# Patient Record
Sex: MALE | Race: WHITE | NOT HISPANIC OR LATINO | Employment: OTHER | URBAN - METROPOLITAN AREA
[De-identification: names, ages, dates, MRNs, and addresses within clinical notes are randomized per-mention and may not be internally consistent; named-entity substitution may affect disease eponyms.]

---

## 2017-01-12 ENCOUNTER — OFFICE VISIT (OUTPATIENT)
Dept: OPTOMETRY | Facility: CLINIC | Age: 77
End: 2017-01-12
Payer: MEDICARE

## 2017-01-12 DIAGNOSIS — H53.8 BLURRED VISION: Primary | ICD-10-CM

## 2017-01-12 PROCEDURE — 99499 UNLISTED E&M SERVICE: CPT | Mod: S$GLB,,, | Performed by: OPTOMETRIST

## 2017-01-12 NOTE — PROGRESS NOTES
HPI     Blurred Vision    Additional comments: pt states blurred VA OU at both near & distance w/   new specs made here last month // 2nd remake       Last edited by Yari Tucker on 1/12/2017  8:27 AM. (History)            Assessment /Plan     For exam results, see Encounter Report.    Blurred vision      Refraction stable  Issues with progressives and functional monocular vision  Discussed options  Readers/ distance only  FT bifocal  FT trifocal    Remake to FT trifocal---call if issues

## 2017-04-12 ENCOUNTER — HOSPITAL ENCOUNTER (OUTPATIENT)
Dept: RADIOLOGY | Facility: HOSPITAL | Age: 77
Discharge: HOME | End: 2017-04-12
Attending: PODIATRIST
Payer: MEDICARE

## 2017-04-12 ENCOUNTER — OFFICE VISIT (OUTPATIENT)
Dept: PODIATRY | Facility: CLINIC | Age: 77
End: 2017-04-12
Payer: MEDICARE

## 2017-04-12 VITALS — WEIGHT: 240.75 LBS | HEIGHT: 70 IN | BODY MASS INDEX: 34.47 KG/M2

## 2017-04-12 DIAGNOSIS — M79.661 PAIN OF RIGHT CALF: Primary | ICD-10-CM

## 2017-04-12 DIAGNOSIS — M79.661 PAIN OF RIGHT CALF: ICD-10-CM

## 2017-04-12 DIAGNOSIS — I87.2 VENOUS INSUFFICIENCY OF BOTH LOWER EXTREMITIES: ICD-10-CM

## 2017-04-12 DIAGNOSIS — I83.93 VARICOSE VEINS OF BOTH LOWER EXTREMITIES: ICD-10-CM

## 2017-04-12 PROCEDURE — 93971 EXTREMITY STUDY: CPT | Mod: 26,RT,, | Performed by: RADIOLOGY

## 2017-04-12 PROCEDURE — 1160F RVW MEDS BY RX/DR IN RCRD: CPT | Mod: S$GLB,,, | Performed by: PODIATRIST

## 2017-04-12 PROCEDURE — 93971 EXTREMITY STUDY: CPT | Mod: TC,PO,RT

## 2017-04-12 PROCEDURE — 99213 OFFICE O/P EST LOW 20 MIN: CPT | Mod: S$GLB,,, | Performed by: PODIATRIST

## 2017-04-12 PROCEDURE — 1159F MED LIST DOCD IN RCRD: CPT | Mod: S$GLB,,, | Performed by: PODIATRIST

## 2017-04-12 PROCEDURE — 99999 PR PBB SHADOW E&M-EST. PATIENT-LVL III: CPT | Mod: PBBFAC,,, | Performed by: PODIATRIST

## 2017-04-12 PROCEDURE — 1157F ADVNC CARE PLAN IN RCRD: CPT | Mod: S$GLB,,, | Performed by: PODIATRIST

## 2017-04-12 PROCEDURE — 1125F AMNT PAIN NOTED PAIN PRSNT: CPT | Mod: S$GLB,,, | Performed by: PODIATRIST

## 2017-04-12 NOTE — PROGRESS NOTES
"Subjective:      Patient ID: Samson Campos Jr. is a 76 y.o. male.    Chief Complaint: Heel Pain (rt foot) and Leg Pain (rt leg x3 weeks)  Patient presents to clinic with the complaint of Rt. Medial leg pain for the past 3 weeks.  Denies trauma to the affected limb.  States that a portion of the leg began to form a "knot" that has been exquisitely tender to touch for over three weeks ago.  States that symptoms have recently lessened over the past several days.  Reports being seen by his PCP who placed him on both tramadol and a medrol dose pack which has helped to improve symptoms.  Relates that he occasionally has a flare up of previous Rt. Posterior heel pain, but is asymptomatic with today's exam.  Denies any additional pedal complaints.    Past Medical History:   Diagnosis Date    Atrial fibrillation 8/21/2015    Cataract     os    Edema 8/21/2015    Fractures     Hypertension     Nuclear sclerosis 3/11/2015    Obstructive sleep apnea 1/14/2016    Unspecified essential hypertension 8/20/2015    Venous insufficiency 1/14/2016       Past Surgical History:   Procedure Laterality Date    CATARACT EXTRACTION W/  INTRAOCULAR LENS IMPLANT Right 3/11/15    By Tan    COLONOSCOPY      GALLBLADDER SURGERY      JOINT REPLACEMENT Right     Knee    ROTATOR CUFF REPAIR         Family History   Problem Relation Age of Onset    Cataracts Mother     Cancer Father     Amblyopia Neg Hx     Blindness Neg Hx     Diabetes Neg Hx     Glaucoma Neg Hx     Hypertension Neg Hx     Macular degeneration Neg Hx     Retinal detachment Neg Hx     Strabismus Neg Hx     Stroke Neg Hx     Thyroid disease Neg Hx        Social History     Social History    Marital status:      Spouse name: N/A    Number of children: N/A    Years of education: N/A     Social History Main Topics    Smoking status: Former Smoker    Smokeless tobacco: Never Used    Alcohol use 8.4 oz/week     14 Cans of beer per week    Drug " use: No    Sexual activity: Not Asked     Other Topics Concern    None     Social History Narrative       Current Outpatient Prescriptions   Medication Sig Dispense Refill    apixaban (ELIQUIS) 5 mg Tab Take 1 tablet (5 mg total) by mouth 2 (two) times daily. 60 tablet 0    cyanocobalamin 1,000 mcg/mL injection INJECT 1 ML (1,000 MCG TOTAL) INTO THE MUSCLE EVERY  30 DAYS. 10 mL 5    hydrocodone-acetaminophen 10-325mg (NORCO)  mg Tab Take 1 tablet by mouth as needed.       metoprolol succinate (TOPROL-XL) 25 MG 24 hr tablet Take 1 tablet (25 mg total) by mouth 2 (two) times daily. 60 tablet 2    predniSONE (DELTASONE) 20 MG tablet 2 tablets once daily for 5 days. 10 tablet 0    tramadol (ULTRAM) 50 mg tablet Take 1 tablet (50 mg total) by mouth every 6 (six) hours as needed for Pain. 40 tablet 1     No current facility-administered medications for this visit.        Review of patient's allergies indicates:  No Known Allergies    Review of Systems   Constitution: Negative for chills and fever.   Cardiovascular: Positive for leg swelling. Negative for claudication.   Skin: Positive for color change and nail changes.   Musculoskeletal: Positive for myalgias. Negative for arthritis, joint pain and joint swelling.   Neurological: Negative for numbness and paresthesias.   Psychiatric/Behavioral: Negative for altered mental status.           Objective:      Physical Exam   Constitutional: He is oriented to person, place, and time. He appears well-developed and well-nourished. No distress.   Cardiovascular:   Pulses:       Dorsalis pedis pulses are 2+ on the right side, and 2+ on the left side.        Posterior tibial pulses are 1+ on the right side, and 1+ on the left side.   CFT <3 seconds bilateral.  Pedal hair growth present bilateral.  Varicosities noted bilateral.  Moderate nonpitting edema noted bilateral.    Musculoskeletal: He exhibits edema and tenderness.   Muscle strength 5/5 in all muscle groups  bilateral.  No pain with active or passive ROM about bilateral foot and ankle.   (+) Christensen sign on the Rt.  (-) Susi sign on the Rt.  Swelling and induration noted to the Rt. Medial lower leg along distal aspect of the medial gastrocnemius that is tender to palpation.  No pain with palpation to the posterior superior aspect of the Rt. Calcaneus at the present site of an exostosis.  No pain at insertion of the Rt. Achilles nor palpable dell or defect noted to the tendon. Bilateral pes cavus foot type.  Bilateral hallux abducto valgus.  Bilateral semi-reducible contracture of toes 2-5.  Bilateral equinus deformity.   Neurological: He is alert and oriented to person, place, and time. He has normal strength. No sensory deficit.   Light touch intact bilateral.     Skin: Skin is warm, dry and intact. No abrasion, no bruising, no burn, no ecchymosis, no laceration, no lesion, no petechiae and no rash noted. He is not diaphoretic. No cyanosis or erythema. No pallor. Nails show no clubbing.   Pedal skin has normal turgor, temperature, and texture bilateral.  Toenails x 10 appear mycotic. Examination of the skin reveals no evidence of significant maceration, rashes, open lesions, suspicious appearing nevi or other concerning lesions.              Assessment:       Encounter Diagnoses   Name Primary?    Pain of right calf Yes    Venous insufficiency of both lower extremities     Varicose veins of both lower extremities          Plan:       Samson was seen today for heel pain and leg pain.    Diagnoses and all orders for this visit:    Pain of right calf  -     US Lower Extremity Veins Right; Future    Venous insufficiency of both lower extremities    Varicose veins of both lower extremities      I counseled the patient on his conditions, their implications and medical management.    Orders written for a venous ultrasound of the RLE.  No evidence of SVT or DVT appreciated.  Patient's current symptoms are likely due to strain  of the distal medial portion of the gastrocnemius.      Advised to continue with tramadol to address pain.    Advised to apply aspercream to the affected area.    Recommend icing the gastrocnemius at least 20 minutes twice daily.    May ambulate and perform usual activities to tolerance, as he is asymptomatic with normal ambulation.    RTC prn or sooner if symptoms fail to resolve in the next two weeks.    Vinnie Cowan DPM

## 2017-04-12 NOTE — MR AVS SNAPSHOT
[facility]  [address]  [facility] [address]  Phone: [phone]                  Samson Campos JrCassidy   2017 9:20 AM   Office Visit    Description:  Male : 1940   Provider:  Vinnie Cowan DPM   Department:  [place] [facility]           Reason for Visit     Heel Pain     Leg Pain           Diagnoses this Visit        Comments    Pain of right calf    -  Primary     Venous insufficiency of both lower extremities         Varicose veins of both lower extremities                To Do List           Future Appointments        Provider Department Dept Phone    2017 10:00 AM [facility] US[facility] [phone]      Goals (5 Years of Data)     None      Follow-Up and Disposition     Return if symptoms worsen or fail to improve.      [facility]     [facility] Nurse Care Line -  Assistance  Unless otherwise directed by your provider, please contact [facility], our nurse care line that is available for  assistance.     Registered nurses in the [facility] provide: appointment scheduling, clinical advisement, health education, and other advisory services.  Call: [phone] (toll free)               Medications           Message regarding Medications     Verify the changes and/or additions to your medication regime listed below are the same as discussed with your clinician today.  If any of these changes or additions are incorrect, please notify your healthcare provider.             Verify that the below list of medications is an accurate representation of the medications you are currently taking.  If none reported, the list may be blank. If incorrect, please contact your healthcare provider. Carry this list with you in case of emergency.           Current Medications     apixaban (ELIQUIS) 5 mg Tab Take 1 tablet (5 mg total) by mouth 2 (two) times daily.    cyanocobalamin 1,000 mcg/mL injection INJECT 1 ML (1,000 MCG TOTAL) INTO  "THE MUSCLE EVERY  30 DAYS.    hydrocodone-acetaminophen 10-325mg (NORCO)  mg Tab Take 1 tablet by mouth as needed.     metoprolol succinate (TOPROL-XL) 25 MG 24 hr tablet Take 1 tablet (25 mg total) by mouth 2 (two) times daily.    predniSONE (DELTASONE) 20 MG tablet 2 tablets once daily for 5 days.    tramadol (ULTRAM) 50 mg tablet Take 1 tablet (50 mg total) by mouth every 6 (six) hours as needed for Pain.           Clinical Reference Information           Your Vitals Were     Height Weight BMI          5' 10" (1.778 m) 109.2 kg (240 lb 11.9 oz) 34.54 kg/m2        Allergies as of 4/12/2017     No Known Allergies      Immunizations Administered on Date of Encounter - 4/12/2017     None      Orders Placed During Today's Visit     Future Labs/Procedures Expected by Expires    US Lower Extremity Veins Right  4/12/2017 4/12/2018      Language Assistance Services     ATTENTION: Language assistance services are available, free of charge. Please call [phone].      ATENCIÓN: Si apple rodriguez, tiene a rodriguez disposición servicios gratuitos de asistencia lingüística. Llame al [phone].     LORENZA Ý: N?u b?n nói Ti?ng Vi?t, có các d?ch v? h? tr? ngôn ng? mi?n phí whith cho b?n. G?i s? [phone].         [facility] complies with applicable Federal civil rights laws and does not discriminate on the basis of race, color, national origin, age, disability, or sex.        "

## 2017-04-27 ENCOUNTER — OFFICE VISIT (OUTPATIENT)
Dept: PODIATRY | Facility: CLINIC | Age: 77
End: 2017-04-27
Payer: MEDICARE

## 2017-04-27 VITALS — HEIGHT: 70 IN | WEIGHT: 241.38 LBS | BODY MASS INDEX: 34.56 KG/M2

## 2017-04-27 DIAGNOSIS — I87.2 VENOUS INSUFFICIENCY OF BOTH LOWER EXTREMITIES: ICD-10-CM

## 2017-04-27 DIAGNOSIS — I83.93 VARICOSE VEINS OF BOTH LOWER EXTREMITIES: ICD-10-CM

## 2017-04-27 DIAGNOSIS — G60.9 IDIOPATHIC PERIPHERAL NEUROPATHY: ICD-10-CM

## 2017-04-27 DIAGNOSIS — M76.60 ACHILLES TENDINITIS, UNSPECIFIED LATERALITY: Primary | ICD-10-CM

## 2017-04-27 PROCEDURE — 1159F MED LIST DOCD IN RCRD: CPT | Mod: S$GLB,,, | Performed by: PODIATRIST

## 2017-04-27 PROCEDURE — 99213 OFFICE O/P EST LOW 20 MIN: CPT | Mod: S$GLB,,, | Performed by: PODIATRIST

## 2017-04-27 PROCEDURE — 99999 PR PBB SHADOW E&M-EST. PATIENT-LVL III: CPT | Mod: PBBFAC,,, | Performed by: PODIATRIST

## 2017-04-27 PROCEDURE — 1125F AMNT PAIN NOTED PAIN PRSNT: CPT | Mod: S$GLB,,, | Performed by: PODIATRIST

## 2017-04-27 PROCEDURE — 1160F RVW MEDS BY RX/DR IN RCRD: CPT | Mod: S$GLB,,, | Performed by: PODIATRIST

## 2017-04-27 NOTE — MR AVS SNAPSHOT
[facility]  [address]  [facility] [address]  Phone: [phone]                  Samson Campos JrCassidy   2017 10:20 AM   Office Visit    Description:  Male : 1940   Provider:  Vinnie Cowan DPM   Department:  [facility]           Reason for Visit     Foot Pain           Diagnoses this Visit        Comments    Achilles tendinitis, unspecified laterality    -  Primary     Venous insufficiency of both lower extremities         Varicose veins of both lower extremities         Idiopathic peripheral neuropathy                To Do List           Future Appointments        Provider Department Dept Phone    2017 10:20 AM [facility] [phone]      Goals (5 Years of Data)     None      Follow-Up and Disposition     Return in about 4 weeks (around 2017).      [facility]     [facility] Care [facility] -  Assistance  Unless otherwise directed by your provider, please contact [facility], our nurse care line that is available for  assistance.     Registered nurses in the [facility] provide: appointment scheduling, clinical advisement, health education, and other advisory services.  Call: [phone] (toll free)               Medications           Message regarding Medications     Verify the changes and/or additions to your medication regime listed below are the same as discussed with your clinician today.  If any of these changes or additions are incorrect, please notify your healthcare provider.             Verify that the below list of medications is an accurate representation of the medications you are currently taking.  If none reported, the list may be blank. If incorrect, please contact your healthcare provider. Carry this list with you in case of emergency.           Current Medications     apixaban (ELIQUIS) 5 mg Tab Take 1 tablet (5 mg total) by mouth 2 (two) times daily.    cyanocobalamin 1,000  "mcg/mL injection INJECT 1 ML (1,000 MCG TOTAL) INTO THE MUSCLE EVERY  30 DAYS.    hydrocodone-acetaminophen 10-325mg (NORCO)  mg Tab Take 1 tablet by mouth as needed.     metoprolol succinate (TOPROL-XL) 25 MG 24 hr tablet Take 1 tablet (25 mg total) by mouth 2 (two) times daily.    predniSONE (DELTASONE) 20 MG tablet 2 tablets once daily for 5 days.           Clinical Reference Information           Your Vitals Were     Height Weight BMI          5' 10" (1.778 m) 109.5 kg (241 lb 6.5 oz) 34.64 kg/m2        Allergies as of 4/27/2017     No Known Allergies      Immunizations Administered on Date of Encounter - 4/27/2017     None      Instructions    Recommend taking a B-complex vitamin and 600mg of alpha lipoic acid daily.       Language Assistance Services     ATTENTION: Language assistance services are available, free of charge. Please call [phone].      ATENCIÓN: Si habla jennifer, tiene a rodriguez disposición servicios gratuitos de asistencia lingüística. Llame al [phone].     CHÚ Ý: N?u b?n nói Ti?ng Vi?t, có các d?ch v? h? tr? ngôn ng? mi?n phí lucy cho b?n. G?i s? [phone].         [facility] complies with applicable Federal civil rights laws and does not discriminate on the basis of race, color, national origin, age, disability, or sex.        "

## 2017-04-27 NOTE — PROGRESS NOTES
"Subjective:      Patient ID: Samson Campos Jr. is a 76 y.o. male.    Chief Complaint: Foot Pain (Rt foot pain and swelling , Rt ankle and side of foot)  Patient presents to clinic with contniued pain along the medial border of the Rt. Achilles tendon.  States that pain symptoms continue to be exacerbated with weight bearing activity, however, are also present while at rest.  States that the Rt. Leg feels "dead" and occasionally emits sharp pain that radiates down the foot.  States this is most prominent at night.  Has been applying ice to the Rt. Leg with noticeable improvement in symptoms.  Denies any additional pedal complaints.    Review of Systems   Constitution: Negative for chills and fever.   Cardiovascular: Positive for leg swelling. Negative for claudication.   Skin: Positive for color change and nail changes.   Musculoskeletal: Positive for myalgias. Negative for arthritis, joint pain and joint swelling.   Neurological: Positive for paresthesias. Negative for numbness.   Psychiatric/Behavioral: Negative for altered mental status.           Objective:      Physical Exam   Constitutional: He is oriented to person, place, and time. He appears well-developed and well-nourished. No distress.   Cardiovascular:   Pulses:       Dorsalis pedis pulses are 2+ on the right side, and 2+ on the left side.        Posterior tibial pulses are 1+ on the right side, and 1+ on the left side.   CFT <3 seconds bilateral.  Pedal hair growth present bilateral.  Varicosities noted bilateral.  Moderate nonpitting edema noted bilateral.    Musculoskeletal: He exhibits edema and tenderness.   Muscle strength 5/5 in all muscle groups bilateral.  No pain with active or passive ROM about bilateral foot and ankle.   (-) Christensen sign on the Rt.  (-) Susi sign on the Rt.  Swelling noted to the Rt. Medial lower leg along distal aspect of the medial gastrocnemius that is tender to palpation.  No pain with palpation to the posterior superior " aspect of the Rt. Calcaneus at the present site of an exostosis.  Pain with palpation along the proximal most portion of the Rt. Achilles tendon.  Mild tenderness with both active and passive dorsiflexion of the Rt. Ankle.   Bilateral pes cavus foot type.  Bilateral hallux abducto valgus.  Bilateral semi-reducible contracture of toes 2-5.  Bilateral equinus deformity.   Neurological: He is alert and oriented to person, place, and time. He has normal strength. A sensory deficit is present.   Protective sensation per Warrior-Niko monofilament decreased bilateral.    Vibratory sensation decreased bilateral.    Light touch intact bilateral.   Skin: Skin is warm, dry and intact. No abrasion, no bruising, no burn, no ecchymosis, no laceration, no lesion, no petechiae and no rash noted. He is not diaphoretic. No cyanosis or erythema. No pallor. Nails show no clubbing.   Pedal skin has normal turgor, temperature, and texture bilateral.  Toenails x 10 appear mycotic. Examination of the skin reveals no evidence of significant maceration, rashes, open lesions, suspicious appearing nevi or other concerning lesions.              Assessment:       Encounter Diagnoses   Name Primary?    Venous insufficiency of both lower extremities     Varicose veins of both lower extremities     Achilles tendinitis, unspecified laterality Yes    Idiopathic peripheral neuropathy          Plan:       Samson was seen today for foot pain.    Diagnoses and all orders for this visit:    Achilles tendinitis, unspecified laterality    Venous insufficiency of both lower extremities    Varicose veins of both lower extremities    Idiopathic peripheral neuropathy      I counseled the patient on his conditions, their implications and medical management.    Advised toe RICE the RLE.    Fitted and dispensed a postoperative boot to offload the Rt. Achilles tendon.    Recommend minimal weight bearing activity.    Advised to begin taking a B-complex  vitamin and 600mg of alpha lipoic acid daily.    RTC in 1 month for follow up.    Vinnie Cowan DPM

## 2017-05-19 ENCOUNTER — OFFICE VISIT (OUTPATIENT)
Dept: PODIATRY | Facility: CLINIC | Age: 77
End: 2017-05-19
Payer: MEDICARE

## 2017-05-19 VITALS — WEIGHT: 239.19 LBS | HEIGHT: 70 IN | BODY MASS INDEX: 34.24 KG/M2

## 2017-05-19 DIAGNOSIS — M76.60 ACHILLES TENDINITIS, UNSPECIFIED LATERALITY: ICD-10-CM

## 2017-05-19 DIAGNOSIS — I87.2 VENOUS INSUFFICIENCY OF BOTH LOWER EXTREMITIES: ICD-10-CM

## 2017-05-19 DIAGNOSIS — G60.9 IDIOPATHIC PERIPHERAL NEUROPATHY: ICD-10-CM

## 2017-05-19 DIAGNOSIS — I83.93 VARICOSE VEINS OF BOTH LOWER EXTREMITIES: ICD-10-CM

## 2017-05-19 DIAGNOSIS — M21.6X9 EQUINUS DEFORMITY OF FOOT: ICD-10-CM

## 2017-05-19 DIAGNOSIS — M79.661 PAIN OF RIGHT CALF: Primary | ICD-10-CM

## 2017-05-19 PROCEDURE — 1125F AMNT PAIN NOTED PAIN PRSNT: CPT | Mod: S$GLB,,, | Performed by: PODIATRIST

## 2017-05-19 PROCEDURE — 99999 PR PBB SHADOW E&M-EST. PATIENT-LVL III: CPT | Mod: PBBFAC,,, | Performed by: PODIATRIST

## 2017-05-19 PROCEDURE — 1160F RVW MEDS BY RX/DR IN RCRD: CPT | Mod: S$GLB,,, | Performed by: PODIATRIST

## 2017-05-19 PROCEDURE — 1159F MED LIST DOCD IN RCRD: CPT | Mod: S$GLB,,, | Performed by: PODIATRIST

## 2017-05-19 PROCEDURE — 99213 OFFICE O/P EST LOW 20 MIN: CPT | Mod: S$GLB,,, | Performed by: PODIATRIST

## 2017-05-19 NOTE — MR AVS SNAPSHOT
[facility]  [address]  [facility] [address]  Phone: [phone]                  Samson Campos JrCassidy   2017 10:20 AM   Office Visit    Description:  Male : 1940   Provider:  Vinnie Cowan DPM   Department:  [facility]           Reason for Visit     Leg Problem           Diagnoses this Visit        Comments    Pain of right calf    -  Primary     Achilles tendinitis, unspecified laterality         Idiopathic peripheral neuropathy                To Do List           Future Appointments        Provider Department Dept Phone    2017 8:00 AM [facility] MRI1 [address] [facility] [phone]      Goals (5 Years of Data)     None      [facility]     [facility] Nurse Care [facility] -  Assistance  Unless otherwise directed by your provider, please contact [facility], our nurse care line that is available for  assistance.     Registered nurses in the [facility] provide: appointment scheduling, clinical advisement, health education, and other advisory services.  Call: [phone] (toll free)               Medications           Message regarding Medications     Verify the changes and/or additions to your medication regime listed below are the same as discussed with your clinician today.  If any of these changes or additions are incorrect, please notify your healthcare provider.             Verify that the below list of medications is an accurate representation of the medications you are currently taking.  If none reported, the list may be blank. If incorrect, please contact your healthcare provider. Carry this list with you in case of emergency.           Current Medications     apixaban (ELIQUIS) 5 mg Tab Take 1 tablet (5 mg total) by mouth 2 (two) times daily.    cyanocobalamin 1,000 mcg/mL injection INJECT 1 ML (1,000 MCG TOTAL) INTO THE MUSCLE EVERY  30 DAYS.    hydrocodone-acetaminophen 10-325mg (NORCO)  mg Tab  "Take 1 tablet by mouth as needed.     metoprolol succinate (TOPROL-XL) 25 MG 24 hr tablet Take 1 tablet (25 mg total) by mouth 2 (two) times daily.    predniSONE (DELTASONE) 20 MG tablet 2 tablets once daily for 5 days.    tramadol (ULTRAM) 50 mg tablet Take 1 tablet (50 mg total) by mouth every 6 (six) hours as needed for Pain.           Clinical Reference Information           Your Vitals Were     Height Weight BMI          5' 10" (1.778 m) 108.5 kg (239 lb 3.2 oz) 34.32 kg/m2        Allergies as of 5/19/2017     No Known Allergies      Immunizations Administered on Date of Encounter - 5/19/2017     None      Orders Placed During Today's Visit     Future Labs/Procedures Expected by Expires    Creatinine, serum  5/19/2017 7/18/2018    MRI Lower Extremity W WO Contrast Right  5/19/2017 5/19/2018      Language Assistance Services     ATTENTION: Language assistance services are available, free of charge. Please call [phone].      ATENCIÓN: Si habla jennifer, tiene a rodriguez disposición servicios gratuitos de asistencia lingüística. Llame al [phone].     LORENZA Ý: N?u b?n nói Ti?ng Vi?t, có các d?ch v? h? tr? ngôn ng? mi?n phí whith cho b?n. G?i s? [phone].         [facility] complies with applicable Federal civil rights laws and does not discriminate on the basis of race, color, national origin, age, disability, or sex.        "

## 2017-05-19 NOTE — PROGRESS NOTES
Subjective:      Patient ID: Samson Campos Jr. is a 76 y.o. male.    Chief Complaint: Leg Problem (Pain ,redness and swelling Rt Calf ,ankle and heel)  Patient presents to clinic with contniued pain along the medial border of the Rt. Achilles tendon and gastrocnemius.  States that pain symptoms continue to be exacerbated with weight bearing activity, and they are now exacerbated with pressure while at rest.  Has attempted to offload with the postoperative boot, however, states this only exacerbated symptoms.  States that he continues icing the leg which does provide relief.  He has also been both elevating and wearing compression stockings as well.  Currently rates pain symptoms as an 8/10.  Denies any additional pedal complaints.    Review of Systems   Constitution: Negative for chills and fever.   Cardiovascular: Positive for leg swelling. Negative for claudication.   Skin: Positive for color change and nail changes.   Musculoskeletal: Positive for myalgias. Negative for arthritis, joint pain and joint swelling.   Neurological: Positive for paresthesias. Negative for numbness.   Psychiatric/Behavioral: Negative for altered mental status.           Objective:      Physical Exam   Constitutional: He is oriented to person, place, and time. He appears well-developed and well-nourished. No distress.   Cardiovascular:   Pulses:       Dorsalis pedis pulses are 2+ on the right side, and 2+ on the left side.        Posterior tibial pulses are 1+ on the right side, and 1+ on the left side.   CFT <3 seconds bilateral.  Pedal hair growth present bilateral.  Varicosities noted bilateral.  1+ pitting edema noted to the RLE.    Musculoskeletal: He exhibits edema and tenderness.   Muscle strength 5/5 in all muscle groups bilateral.  No pain with active or passive ROM about bilateral foot and ankle.   (-) Christensen sign on the Rt.  (-) Susi sign on the Rt.  Swelling noted to the Rt. Medial lower leg along distal aspect of the medial  gastrocnemius that is tender to palpation.  No pain with palpation to the posterior superior aspect of the Rt. Calcaneus at the present site of an exostosis.  Pain with palpation along the proximal most portion of the Rt. Achilles tendon.  Moderate pain with both active and passive dorsiflexion of the Rt. Ankle.   Bilateral pes cavus foot type.  Bilateral hallux abducto valgus.  Bilateral semi-reducible contracture of toes 2-5.  Bilateral equinus deformity.   Neurological: He is alert and oriented to person, place, and time. He has normal strength. A sensory deficit is present.   Protective sensation per Brohman-Niko monofilament decreased bilateral.    Vibratory sensation decreased bilateral.    Light touch intact bilateral.   Skin: Skin is warm, dry and intact. No abrasion, no bruising, no burn, no ecchymosis, no laceration, no lesion, no petechiae and no rash noted. He is not diaphoretic. No cyanosis or erythema. No pallor. Nails show no clubbing.   Pedal skin has normal turgor, temperature, and texture bilateral.  Toenails x 10 appear mycotic. Examination of the skin reveals no evidence of significant maceration, rashes, open lesions, suspicious appearing nevi or other concerning lesions.              Assessment:       Encounter Diagnoses   Name Primary?    Pain of right calf Yes    Achilles tendinitis, unspecified laterality     Idiopathic peripheral neuropathy     Venous insufficiency of both lower extremities     Varicose veins of both lower extremities     Equinus deformity of foot          Plan:       Samson was seen today for leg problem.    Diagnoses and all orders for this visit:    Pain of right calf  -     MRI Lower Extremity W WO Contrast Right; Future  -     Creatinine, serum; Future    Achilles tendinitis, unspecified laterality  -     MRI Lower Extremity W WO Contrast Right; Future  -     Creatinine, serum; Future    Idiopathic peripheral neuropathy    Venous insufficiency of both lower  extremities    Varicose veins of both lower extremities    Equinus deformity of foot      I counseled the patient on his conditions, their implications and medical management.    Advised to continue RICE of the RLE.    Explained that wearing the postoperative boot is necessary to effectively offload the Achilles tendon.    Advised to take one tramadol daily to help with pain.    Recommend minimal weight bearing activity.    Advised to continue taking a B-complex vitamin and 600mg of alpha lipoic acid daily.    Will obtain an MRI of the RLE to rule out any additional pathology responsible for pain.    RTC pending MRI results.    Vinnie Cowan DPM

## 2017-05-20 ENCOUNTER — HOSPITAL ENCOUNTER (OUTPATIENT)
Dept: RADIOLOGY | Facility: HOSPITAL | Age: 77
Discharge: HOME | End: 2017-05-20
Attending: PODIATRIST
Payer: MEDICARE

## 2017-05-20 DIAGNOSIS — M79.661 PAIN OF RIGHT CALF: ICD-10-CM

## 2017-05-20 DIAGNOSIS — M76.60 ACHILLES TENDINITIS, UNSPECIFIED LATERALITY: ICD-10-CM

## 2017-05-20 PROCEDURE — 73718 MRI LOWER EXTREMITY W/O DYE: CPT | Mod: TC,RT

## 2017-05-20 PROCEDURE — 73718 MRI LOWER EXTREMITY W/O DYE: CPT | Mod: 26,RT,, | Performed by: RADIOLOGY

## 2017-06-01 PROBLEM — I49.3 PVCS (PREMATURE VENTRICULAR CONTRACTIONS): Status: ACTIVE | Noted: 2017-06-01

## 2017-07-03 PROBLEM — I48.92 ATRIAL FLUTTER WITH RAPID VENTRICULAR RESPONSE: Status: ACTIVE | Noted: 2017-07-03

## 2017-10-06 PROBLEM — I49.5 SSS (SICK SINUS SYNDROME): Status: ACTIVE | Noted: 2017-10-06

## 2018-02-23 PROBLEM — Z95.0 CARDIAC PACEMAKER: Status: ACTIVE | Noted: 2018-02-23

## 2018-06-25 PROBLEM — I48.0 PAROXYSMAL ATRIAL FIBRILLATION: Status: ACTIVE | Noted: 2018-06-25

## 2018-07-31 PROBLEM — Z98.890 S/P ABLATION OF ATRIAL FIBRILLATION: Status: ACTIVE | Noted: 2018-07-31

## 2018-07-31 PROBLEM — Z86.79 S/P ABLATION OF ATRIAL FIBRILLATION: Status: ACTIVE | Noted: 2018-07-31

## 2020-01-03 ENCOUNTER — CLINICAL SUPPORT (OUTPATIENT)
Dept: AUDIOLOGY | Facility: CLINIC | Age: 80
End: 2020-01-03
Payer: MEDICARE

## 2020-01-03 ENCOUNTER — OFFICE VISIT (OUTPATIENT)
Dept: OTOLARYNGOLOGY | Facility: CLINIC | Age: 80
End: 2020-01-03
Payer: MEDICARE

## 2020-01-03 VITALS — WEIGHT: 232.81 LBS | HEIGHT: 70 IN | BODY MASS INDEX: 33.33 KG/M2

## 2020-01-03 DIAGNOSIS — H61.23 BILATERAL IMPACTED CERUMEN: Primary | ICD-10-CM

## 2020-01-03 DIAGNOSIS — J31.0 CHRONIC RHINITIS: ICD-10-CM

## 2020-01-03 DIAGNOSIS — R09.81 CHRONIC NASAL CONGESTION: ICD-10-CM

## 2020-01-03 DIAGNOSIS — H93.13 TINNITUS, BILATERAL: ICD-10-CM

## 2020-01-03 DIAGNOSIS — H93.8X9 PRESSURE SENSATION IN EAR, UNSPECIFIED LATERALITY: Primary | ICD-10-CM

## 2020-01-03 PROCEDURE — 1101F PT FALLS ASSESS-DOCD LE1/YR: CPT | Mod: CPTII,S$GLB,, | Performed by: NURSE PRACTITIONER

## 2020-01-03 PROCEDURE — 99203 PR OFFICE/OUTPT VISIT, NEW, LEVL III, 30-44 MIN: ICD-10-PCS | Mod: 25,S$GLB,, | Performed by: NURSE PRACTITIONER

## 2020-01-03 PROCEDURE — 69210 REMOVE IMPACTED EAR WAX UNI: CPT | Mod: S$GLB,,, | Performed by: NURSE PRACTITIONER

## 2020-01-03 PROCEDURE — 1126F PR PAIN SEVERITY QUANTIFIED, NO PAIN PRESENT: ICD-10-PCS | Mod: S$GLB,,, | Performed by: NURSE PRACTITIONER

## 2020-01-03 PROCEDURE — 1159F PR MEDICATION LIST DOCUMENTED IN MEDICAL RECORD: ICD-10-PCS | Mod: S$GLB,,, | Performed by: NURSE PRACTITIONER

## 2020-01-03 PROCEDURE — 1101F PR PT FALLS ASSESS DOC 0-1 FALLS W/OUT INJ PAST YR: ICD-10-PCS | Mod: CPTII,S$GLB,, | Performed by: NURSE PRACTITIONER

## 2020-01-03 PROCEDURE — 1126F AMNT PAIN NOTED NONE PRSNT: CPT | Mod: S$GLB,,, | Performed by: NURSE PRACTITIONER

## 2020-01-03 PROCEDURE — 1159F MED LIST DOCD IN RCRD: CPT | Mod: S$GLB,,, | Performed by: NURSE PRACTITIONER

## 2020-01-03 PROCEDURE — 69210 PR REMOVAL IMPACTED CERUMEN REQUIRING INSTRUMENTATION, UNILATERAL: ICD-10-PCS | Mod: S$GLB,,, | Performed by: NURSE PRACTITIONER

## 2020-01-03 PROCEDURE — 99999 PR PBB SHADOW E&M-EST. PATIENT-LVL III: ICD-10-PCS | Mod: PBBFAC,,, | Performed by: NURSE PRACTITIONER

## 2020-01-03 PROCEDURE — 99999 PR PBB SHADOW E&M-EST. PATIENT-LVL III: CPT | Mod: PBBFAC,,, | Performed by: NURSE PRACTITIONER

## 2020-01-03 PROCEDURE — 99203 OFFICE O/P NEW LOW 30 MIN: CPT | Mod: 25,S$GLB,, | Performed by: NURSE PRACTITIONER

## 2020-01-03 NOTE — PROGRESS NOTES
Samson Campos Jr. was seen 01/03/2020 for tympanometry per order from Kenna Shukla, ENT NP,  due to complaint of ear pressure. Results indicate Type A for the right ear indicating normal middle ear function and Type A for the left ear indicating normal middle ear function.     Results will be reviewed by ENT following this encounter.

## 2020-01-03 NOTE — PROGRESS NOTES
Subjective:       Patient ID: Samson Campos Jr. is a 79 y.o. male.    Chief Complaint: Sinus Problem (Congestion, mouth breather) and Other (second opinion on ears)    HPI   Patient states his ears stop up periodically. He was evaluated at [facility] and was told he needs hearing aids. He was unable to afford them. He states he was given antibiotics a couple of times for his ears. He states he was told to use Nasacort every morning and saline rinsing every night. He states he was encouraged to consider PE tube placement AU. He is here today primarily for a second opinion on PE placement.   CT temporal bones 8/5/19:    LEFT: No opacification of the middle ear. Partial opacification of the air cells in the tip of the mastoid without any significant erosion of the bony walls of the mastoid air cells.  Findings are suggestive of mastoid tip effusion.  Rest of the mastoid air cells appear to be within normal limits.  RIGHT: No opacification of the middle ear.  In the tip of the right mastoid there are couple of air cells there appear to be opacified with fluid, likely from mastoid effusion.  No erosion of the bony septa of the mastoid air cells to suggest mastoiditis.   Audiogram was done at [facility] (date unknown if > 12 months ago, and not sure if air-bone gap was noted).     Review of Systems   Constitutional: Negative.    HENT: Positive for congestion, hearing loss and tinnitus.    Eyes: Negative.    Respiratory: Negative.    Cardiovascular: Negative.    Gastrointestinal: Negative.    Musculoskeletal: Negative.    Skin: Negative.    Neurological: Negative.    Hematological: Negative.    Psychiatric/Behavioral: Negative.        Objective:      Physical Exam   Constitutional: He is oriented to person, place, and time. Vital signs are normal. He appears well-developed and well-nourished. He is cooperative. He does not appear ill. No distress.   HENT:   Head: Normocephalic and atraumatic.   Right Ear: Hearing, tympanic  "membrane, external ear and ear canal normal. Tympanic membrane is not erythematous. Tympanic membrane mobility is normal. No middle ear effusion.   Left Ear: Hearing, tympanic membrane, external ear and ear canal normal. Tympanic membrane is not erythematous. Tympanic membrane mobility is normal.  No middle ear effusion.   Nose: Nose normal. No mucosal edema or rhinorrhea.   Mouth/Throat: Uvula is midline, oropharynx is clear and moist and mucous membranes are normal. He has dentures.     SEPARATE PROCEDURE IN OFFICE:   Procedure: Removal of impacted cerumen, bilateral   Pre Procedure Diagnosis: Cerumen Impaction   Post Procedure Diagnosis: Cerumen Impaction   Verbal informed consent in regards to risk of trauma to ear canal, ear drum or hearing, discomfort during procedure and/or inability to remove cerumen impaction in one session or unforeseen events or complications.   No anesthesia.     Procedure in detail:   Ear canal visualized bilateral with appropriate size ear speculum utilizing Operating Head Binocular Otomicroscope   Utilizing the following: Suction cannula was used AU. The impacted cerumen of the ear canals was removed atraumatically. The TM and EAC were then inspected and found to be clear of wax. See description of TMs/EACs in PE above.   Complications: No   Condition: Improved/Good    Type "A" tympanograms consistent with well-pneumatized mesotympanums and absence of middle ear effusions AU.     Eyes: EOM and lids are normal. Right eye exhibits no discharge. Left eye exhibits no discharge. No scleral icterus.   Neck: Trachea normal and normal range of motion. Neck supple. No tracheal deviation present.   Cardiovascular: Normal rate.   Pulmonary/Chest: Effort normal. No stridor. No respiratory distress. He has no wheezes.   Musculoskeletal: Normal range of motion.   Neurological: He is alert and oriented to person, place, and time. He has normal strength. Coordination and gait normal.   Skin: Skin is " "warm, dry and intact. He is not diaphoretic. No cyanosis. No pallor.   Psychiatric: He has a normal mood and affect. His speech is normal and behavior is normal. Judgment and thought content normal. Cognition and memory are normal.   Nursing note and vitals reviewed.      Assessment:     Bilateral cerumen impactions removed    Type "A" tympanograms consistent with well-pneumatized mesotympanums and absence of middle ear effusions  Need a copy of pt's most recent audiogram (if done > 12 months ago, we can repeat it here at any time)   Plan:     Recommend audiogram 12 months from date of most recent audiogram. If significant air-bone gap is noted, possible consideration of PETs (though type "A" tympanograms today, which is consistent with well-pneumatized mesotympanums and absence of middle ear effusions; however it may be that his recent CT findings are a chronic finding and unlikely to change. If no air-bone gap is noted, recommendation would consist of binaural amplification.     I agree that his regimen of Nasacort in AM and nasal saline rinsing in PM is a good one. My only suggestion is that he consider increasing both to twice daily during particularly severe nasal exacerbations in the hopes of staving off bacterial sinusitis.   "

## 2022-03-28 ENCOUNTER — TELEPHONE (OUTPATIENT)
Dept: OTOLARYNGOLOGY | Facility: CLINIC | Age: 82
End: 2022-03-28
Payer: MEDICARE

## 2022-03-28 NOTE — TELEPHONE ENCOUNTER
----- Message from Berenice Gtz sent at 3/28/2022  1:28 PM CDT -----  Type:  Sooner Apoointment Request    Caller is requesting a sooner appointment.  Caller declined first available appointment listed below.  Caller will not accept being placed on the waitlist and is requesting a message be sent to doctor.    Name of Caller:  Patient  When is the first available appointment?  5/2  Symptoms:  ear pain  Best Call Back Number:  [phone]  Additional Information:  Calling to schedule sooner appointment if possible for

## 2022-04-01 ENCOUNTER — OFFICE VISIT (OUTPATIENT)
Dept: OTOLARYNGOLOGY | Facility: CLINIC | Age: 82
End: 2022-04-01
Payer: MEDICARE

## 2022-04-01 VITALS — BODY MASS INDEX: 32.51 KG/M2 | HEIGHT: 70 IN | TEMPERATURE: 99 F | WEIGHT: 227.06 LBS

## 2022-04-01 DIAGNOSIS — H92.03 REFERRED OTALGIA, BILATERAL: ICD-10-CM

## 2022-04-01 DIAGNOSIS — R42 VERTIGO: ICD-10-CM

## 2022-04-01 DIAGNOSIS — H61.23 BILATERAL IMPACTED CERUMEN: Primary | ICD-10-CM

## 2022-04-01 PROCEDURE — 99999 PR PBB SHADOW E&M-EST. PATIENT-LVL III: CPT | Mod: PBBFAC,,, | Performed by: NURSE PRACTITIONER

## 2022-04-01 PROCEDURE — 1160F PR REVIEW ALL MEDS BY PRESCRIBER/CLIN PHARMACIST DOCUMENTED: ICD-10-PCS | Mod: CPTII,S$GLB,, | Performed by: NURSE PRACTITIONER

## 2022-04-01 PROCEDURE — 69210 REMOVE IMPACTED EAR WAX UNI: CPT | Mod: S$GLB,,, | Performed by: NURSE PRACTITIONER

## 2022-04-01 PROCEDURE — 1160F RVW MEDS BY RX/DR IN RCRD: CPT | Mod: CPTII,S$GLB,, | Performed by: NURSE PRACTITIONER

## 2022-04-01 PROCEDURE — 69210 PR REMOVAL IMPACTED CERUMEN REQUIRING INSTRUMENTATION, UNILATERAL: ICD-10-PCS | Mod: S$GLB,,, | Performed by: NURSE PRACTITIONER

## 2022-04-01 PROCEDURE — 1101F PR PT FALLS ASSESS DOC 0-1 FALLS W/OUT INJ PAST YR: ICD-10-PCS | Mod: CPTII,S$GLB,, | Performed by: NURSE PRACTITIONER

## 2022-04-01 PROCEDURE — 3288F PR FALLS RISK ASSESSMENT DOCUMENTED: ICD-10-PCS | Mod: CPTII,S$GLB,, | Performed by: NURSE PRACTITIONER

## 2022-04-01 PROCEDURE — 1159F PR MEDICATION LIST DOCUMENTED IN MEDICAL RECORD: ICD-10-PCS | Mod: CPTII,S$GLB,, | Performed by: NURSE PRACTITIONER

## 2022-04-01 PROCEDURE — 3288F FALL RISK ASSESSMENT DOCD: CPT | Mod: CPTII,S$GLB,, | Performed by: NURSE PRACTITIONER

## 2022-04-01 PROCEDURE — 1125F PR PAIN SEVERITY QUANTIFIED, PAIN PRESENT: ICD-10-PCS | Mod: CPTII,S$GLB,, | Performed by: NURSE PRACTITIONER

## 2022-04-01 PROCEDURE — 99213 OFFICE O/P EST LOW 20 MIN: CPT | Mod: 25,S$GLB,, | Performed by: NURSE PRACTITIONER

## 2022-04-01 PROCEDURE — 99213 PR OFFICE/OUTPT VISIT, EST, LEVL III, 20-29 MIN: ICD-10-PCS | Mod: 25,S$GLB,, | Performed by: NURSE PRACTITIONER

## 2022-04-01 PROCEDURE — 99999 PR PBB SHADOW E&M-EST. PATIENT-LVL III: ICD-10-PCS | Mod: PBBFAC,,, | Performed by: NURSE PRACTITIONER

## 2022-04-01 PROCEDURE — 1101F PT FALLS ASSESS-DOCD LE1/YR: CPT | Mod: CPTII,S$GLB,, | Performed by: NURSE PRACTITIONER

## 2022-04-01 PROCEDURE — 1125F AMNT PAIN NOTED PAIN PRSNT: CPT | Mod: CPTII,S$GLB,, | Performed by: NURSE PRACTITIONER

## 2022-04-01 PROCEDURE — 1159F MED LIST DOCD IN RCRD: CPT | Mod: CPTII,S$GLB,, | Performed by: NURSE PRACTITIONER

## 2022-04-01 NOTE — PROGRESS NOTES
Subjective:       Patient ID: Samson Campos Jr. is a 81 y.o. male.    Chief Complaint: Otalgia    Otalgia   Associated symptoms include hearing loss.      Patient last seen by me >2 years ago for cerumen, tinnitus, hearing loss. Patient returns today stating his ears are stopped up. Patient experienced vertigo upon laying down Sunday night, only felt it slightly upon laying down Monday night.     Review of Systems   Constitutional: Negative.    HENT: Positive for congestion, ear pain, hearing loss and tinnitus.    Eyes: Negative.    Respiratory: Negative.    Cardiovascular: Negative.    Gastrointestinal: Negative.    Musculoskeletal: Negative.    Skin: Negative.    Neurological: Negative.    Hematological: Negative.    Psychiatric/Behavioral: Negative.        Objective:      Physical Exam  Vitals and nursing note reviewed.   Constitutional:       General: He is not in acute distress.     Appearance: He is well-developed. He is not ill-appearing or diaphoretic.   HENT:      Head: Normocephalic and atraumatic.      Right Ear: Hearing, tympanic membrane, ear canal and external ear normal. No middle ear effusion. Tympanic membrane is not erythematous. Tympanic membrane has normal mobility.      Left Ear: Hearing, tympanic membrane, ear canal and external ear normal.  No middle ear effusion. Tympanic membrane is not erythematous. Tympanic membrane has normal mobility.      Nose: Nose normal. No mucosal edema or rhinorrhea.      Mouth/Throat:      Dentition: Has dentures.      Pharynx: Uvula midline.   Eyes:      General: Lids are normal. No scleral icterus.        Right eye: No discharge.         Left eye: No discharge.   Neck:      Trachea: Trachea normal. No tracheal deviation.   Cardiovascular:      Rate and Rhythm: Normal rate.   Pulmonary:      Effort: Pulmonary effort is normal. No respiratory distress.      Breath sounds: No stridor. No wheezing.   Musculoskeletal:         General: Normal range of motion.       Cervical back: Normal range of motion and neck supple.   Skin:     General: Skin is warm and dry.      Coloration: Skin is not pale.   Neurological:      Mental Status: He is alert and oriented to person, place, and time.      Coordination: Coordination normal.      Gait: Gait normal.   Psychiatric:         Speech: Speech normal.         Behavior: Behavior normal. Behavior is cooperative.         Thought Content: Thought content normal.         Judgment: Judgment normal.       SEPARATE PROCEDURE IN OFFICE:   Procedure: Removal of impacted cerumen, bilateral   Pre Procedure Diagnosis: Cerumen Impaction   Post Procedure Diagnosis: Cerumen Impaction   Verbal informed consent in regards to risk of trauma to ear canal, ear drum or hearing, discomfort during procedure and/or inability to remove cerumen impaction in one session or unforeseen events or complications.   No anesthesia.     Procedure in detail:   Ear canal visualized bilateral with appropriate size ear speculum utilizing Operating Head Binocular Otomicroscope   Utilizing the following:  Ring curet, delicate alligator forceps, and/or suction cannula was used. The impacted cerumen of the ear canals was removed atraumatically. The TM and EAC were then inspected and found to be clear of wax. See description of TMs/EACs in PE above.   Complications: No   Condition: Improved/Good      Negative Demian-Hallpike AU       Assessment:     Bilateral cerumen impactions removed    Vertigo  Plan:     Recommend audiogram at his convenience.     No evidence of BPPV at this time  Patient encouraged to return to clinic if symptoms worsen/persist and as needed for further ENT symptoms or concerns.      23.1

## 2022-12-16 ENCOUNTER — OFFICE VISIT (OUTPATIENT)
Dept: OTOLARYNGOLOGY | Facility: CLINIC | Age: 82
End: 2022-12-16
Payer: MEDICARE

## 2022-12-16 VITALS — HEIGHT: 70 IN | BODY MASS INDEX: 32.51 KG/M2 | TEMPERATURE: 99 F | WEIGHT: 227.06 LBS

## 2022-12-16 DIAGNOSIS — M26.623 BILATERAL TEMPOROMANDIBULAR JOINT PAIN: Primary | ICD-10-CM

## 2022-12-16 DIAGNOSIS — H92.03 REFERRED OTALGIA, BILATERAL: ICD-10-CM

## 2022-12-16 DIAGNOSIS — H61.23 BILATERAL IMPACTED CERUMEN: ICD-10-CM

## 2022-12-16 PROCEDURE — 99213 PR OFFICE/OUTPT VISIT, EST, LEVL III, 20-29 MIN: ICD-10-PCS | Mod: 25,S$GLB,, | Performed by: NURSE PRACTITIONER

## 2022-12-16 PROCEDURE — 69210 PR REMOVAL IMPACTED CERUMEN REQUIRING INSTRUMENTATION, UNILATERAL: ICD-10-PCS | Mod: S$GLB,,, | Performed by: NURSE PRACTITIONER

## 2022-12-16 PROCEDURE — 99999 PR PBB SHADOW E&M-EST. PATIENT-LVL II: ICD-10-PCS | Mod: PBBFAC,,, | Performed by: NURSE PRACTITIONER

## 2022-12-16 PROCEDURE — 69210 REMOVE IMPACTED EAR WAX UNI: CPT | Mod: S$GLB,,, | Performed by: NURSE PRACTITIONER

## 2022-12-16 PROCEDURE — 99999 PR PBB SHADOW E&M-EST. PATIENT-LVL II: CPT | Mod: PBBFAC,,, | Performed by: NURSE PRACTITIONER

## 2022-12-16 PROCEDURE — 1159F MED LIST DOCD IN RCRD: CPT | Mod: CPTII,S$GLB,, | Performed by: NURSE PRACTITIONER

## 2022-12-16 PROCEDURE — 3288F FALL RISK ASSESSMENT DOCD: CPT | Mod: CPTII,S$GLB,, | Performed by: NURSE PRACTITIONER

## 2022-12-16 PROCEDURE — 1101F PR PT FALLS ASSESS DOC 0-1 FALLS W/OUT INJ PAST YR: ICD-10-PCS | Mod: CPTII,S$GLB,, | Performed by: NURSE PRACTITIONER

## 2022-12-16 PROCEDURE — 1159F PR MEDICATION LIST DOCUMENTED IN MEDICAL RECORD: ICD-10-PCS | Mod: CPTII,S$GLB,, | Performed by: NURSE PRACTITIONER

## 2022-12-16 PROCEDURE — 1125F PR PAIN SEVERITY QUANTIFIED, PAIN PRESENT: ICD-10-PCS | Mod: CPTII,S$GLB,, | Performed by: NURSE PRACTITIONER

## 2022-12-16 PROCEDURE — 1125F AMNT PAIN NOTED PAIN PRSNT: CPT | Mod: CPTII,S$GLB,, | Performed by: NURSE PRACTITIONER

## 2022-12-16 PROCEDURE — 99213 OFFICE O/P EST LOW 20 MIN: CPT | Mod: 25,S$GLB,, | Performed by: NURSE PRACTITIONER

## 2022-12-16 PROCEDURE — 1101F PT FALLS ASSESS-DOCD LE1/YR: CPT | Mod: CPTII,S$GLB,, | Performed by: NURSE PRACTITIONER

## 2022-12-16 PROCEDURE — 3288F PR FALLS RISK ASSESSMENT DOCUMENTED: ICD-10-PCS | Mod: CPTII,S$GLB,, | Performed by: NURSE PRACTITIONER

## 2022-12-16 NOTE — PROGRESS NOTES
"Subjective:       Patient ID: Samson Campos Jr. is a 82 y.o. male.    Chief Complaint: No chief complaint on file.    HPI  Patient saw me 7 months ago for vertigo, otalgia, and cerumen impactions. No evidence of BPPV at that time. He was encouraged to schedule audiogram at his earliest convenience. Patient returns today for bilateral otalgia. Patient states his ear "bother" him daily, usually in the evening. They get to throbbing, radiates to back of jaw/neck behind ears.     Review of Systems   Constitutional: Negative.    HENT:  Positive for ear pain. Negative for ear discharge.    Eyes: Negative.    Respiratory: Negative.     Cardiovascular: Negative.    Gastrointestinal: Negative.    Musculoskeletal: Negative.    Integumentary:  Negative.   Neurological: Negative.    Hematological: Negative.    Psychiatric/Behavioral: Negative.         Objective:      Physical Exam  Vitals and nursing note reviewed.   Constitutional:       General: He is not in acute distress.     Appearance: He is well-developed. He is not ill-appearing.   HENT:      Head: Normocephalic and atraumatic.      Right Ear: Hearing, tympanic membrane, ear canal and external ear normal. No middle ear effusion. Tympanic membrane is not erythematous.      Left Ear: Hearing, tympanic membrane, ear canal and external ear normal.  No middle ear effusion. Tympanic membrane is not erythematous.      Nose: Nose normal.   Eyes:      General: Lids are normal. No scleral icterus.        Right eye: No discharge.         Left eye: No discharge.   Neck:      Trachea: Trachea normal. No tracheal deviation.   Cardiovascular:      Rate and Rhythm: Normal rate.   Pulmonary:      Effort: Pulmonary effort is normal. No respiratory distress.      Breath sounds: No stridor. No wheezing.   Musculoskeletal:         General: Normal range of motion.      Cervical back: Normal range of motion.   Skin:     General: Skin is warm and dry.   Neurological:      Mental Status: He is " alert and oriented to person, place, and time.      Coordination: Coordination is intact.      Gait: Gait normal.   Psychiatric:         Attention and Perception: Attention normal.         Mood and Affect: Mood normal.         Speech: Speech normal.         Behavior: Behavior normal. Behavior is cooperative.       SEPARATE PROCEDURE IN OFFICE:   Procedure: Removal of impacted cerumen, bilateral   Pre Procedure Diagnosis: Cerumen Impaction   Post Procedure Diagnosis: Cerumen Impaction   Verbal informed consent in regards to risk of trauma to ear canal, ear drum or hearing, discomfort during procedure and/or inability to remove cerumen impaction in one session or unforeseen events or complications.   No anesthesia.     Procedure in detail:   Ear canal visualized bilateral with appropriate size ear speculum utilizing Operating Head Binocular Otomicroscope   Utilizing the following:  Ring curet, delicate alligator forceps, and/or suction cannula was used. The impacted cerumen of the ear canals was removed atraumatically. The TM and EAC were then inspected and found to be clear of wax. See description of TMs/EACs in PE above.   Complications: No   Condition: Improved/Good    Assessment:       Problem List Items Addressed This Visit    None  Visit Diagnoses       Bilateral temporomandibular joint pain    -  Primary    Bilateral impacted cerumen                Plan:         NSAIDs and rest for jaw arthritis  Reassured negative aural exam  Recommend semiannual ear cleanings  Patient encouraged to return to clinic if symptoms worsen/persist and as needed for further ENT symptoms or concerns.

## 2023-02-02 ENCOUNTER — TELEPHONE (OUTPATIENT)
Dept: HEMATOLOGY/ONCOLOGY | Facility: CLINIC | Age: 83
End: 2023-02-02
Payer: MEDICARE

## 2023-02-02 NOTE — TELEPHONE ENCOUNTER
Spoke to pt and spoke about hem referral to Dr Cabello.  Presently the pt is on abx for URI, so did not klever for tomorrow.   Sched pt for Monday and confirmed appt date time and location w/ pt.     Had advanced schedulers create slot on Dr Cabello for Monday, there was not availability on her [facility] hem day tues for new pt,

## 2023-02-06 ENCOUNTER — OFFICE VISIT (OUTPATIENT)
Dept: HEMATOLOGY/ONCOLOGY | Facility: CLINIC | Age: 83
End: 2023-02-06
Payer: MEDICARE

## 2023-02-06 ENCOUNTER — LAB VISIT (OUTPATIENT)
Dept: LAB | Facility: HOSPITAL | Age: 83
End: 2023-02-06
Attending: INTERNAL MEDICINE
Payer: MEDICARE

## 2023-02-06 VITALS
HEIGHT: 70 IN | WEIGHT: 221.13 LBS | SYSTOLIC BLOOD PRESSURE: 136 MMHG | DIASTOLIC BLOOD PRESSURE: 75 MMHG | OXYGEN SATURATION: 98 % | RESPIRATION RATE: 16 BRPM | BODY MASS INDEX: 31.66 KG/M2 | TEMPERATURE: 98 F | HEART RATE: 80 BPM

## 2023-02-06 DIAGNOSIS — R53.82 CHRONIC FATIGUE, UNSPECIFIED: ICD-10-CM

## 2023-02-06 DIAGNOSIS — E66.9 OBESITY (BMI 30.0-34.9): ICD-10-CM

## 2023-02-06 DIAGNOSIS — D69.6 THROMBOCYTOPENIA: ICD-10-CM

## 2023-02-06 DIAGNOSIS — D51.8 VITAMIN B12 DEFICIENCY (DIETARY) ANEMIA: ICD-10-CM

## 2023-02-06 DIAGNOSIS — Z95.0 CARDIAC PACEMAKER: ICD-10-CM

## 2023-02-06 DIAGNOSIS — D64.9 ANEMIA, UNSPECIFIED TYPE: ICD-10-CM

## 2023-02-06 DIAGNOSIS — M19.90 ARTHRITIS: ICD-10-CM

## 2023-02-06 DIAGNOSIS — I49.5 SSS (SICK SINUS SYNDROME): ICD-10-CM

## 2023-02-06 DIAGNOSIS — D72.819 LEUKOPENIA, UNSPECIFIED TYPE: ICD-10-CM

## 2023-02-06 DIAGNOSIS — I48.92 ATRIAL FLUTTER WITH RAPID VENTRICULAR RESPONSE: ICD-10-CM

## 2023-02-06 DIAGNOSIS — D75.89 MACROCYTOSIS: ICD-10-CM

## 2023-02-06 DIAGNOSIS — M19.90 ARTHRITIS: Primary | ICD-10-CM

## 2023-02-06 PROBLEM — E66.811 OBESITY (BMI 30.0-34.9): Status: ACTIVE | Noted: 2023-02-06

## 2023-02-06 LAB
BASOPHILS # BLD AUTO: 0 K/UL (ref 0–0.2)
BASOPHILS NFR BLD: 0 % (ref 0–1.9)
DIFFERENTIAL METHOD: ABNORMAL
EOSINOPHIL # BLD AUTO: 0 K/UL (ref 0–0.5)
EOSINOPHIL NFR BLD: 0 % (ref 0–8)
ERYTHROCYTE [DISTWIDTH] IN BLOOD BY AUTOMATED COUNT: 12 % (ref 11.5–14.5)
FERRITIN SERPL-MCNC: 654 NG/ML (ref 20–300)
HAPTOGLOB SERPL-MCNC: 178 MG/DL (ref 30–250)
HAV IGM SERPL QL IA: NORMAL
HBV CORE IGM SERPL QL IA: NORMAL
HBV SURFACE AG SERPL QL IA: NORMAL
HCT VFR BLD AUTO: 36.9 % (ref 40–54)
HCV AB SERPL QL IA: NORMAL
HCYS SERPL-SCNC: 7.5 UMOL/L (ref 4–16.5)
HGB BLD-MCNC: 12.8 G/DL (ref 14–18)
HIV1+2 IGG SERPL QL IA.RAPID: NORMAL
IMM GRANULOCYTES # BLD AUTO: 0.04 K/UL (ref 0–0.04)
IMM GRANULOCYTES NFR BLD AUTO: 0.6 % (ref 0–0.5)
IRON SERPL-MCNC: 77 UG/DL (ref 45–160)
LDH SERPL L TO P-CCNC: 188 U/L (ref 110–260)
LYMPHOCYTES # BLD AUTO: 1.1 K/UL (ref 1–4.8)
LYMPHOCYTES NFR BLD: 15.2 % (ref 18–48)
MCH RBC QN AUTO: 34.1 PG (ref 27–31)
MCHC RBC AUTO-ENTMCNC: 34.7 G/DL (ref 32–36)
MCV RBC AUTO: 98 FL (ref 82–98)
MONOCYTES # BLD AUTO: 0.4 K/UL (ref 0.3–1)
MONOCYTES NFR BLD: 5.4 % (ref 4–15)
MPV, BLUE TOP: 9.9 FL (ref 9.2–12.9)
NEUTROPHILS # BLD AUTO: 5.6 K/UL (ref 1.8–7.7)
NEUTROPHILS NFR BLD: 78.8 % (ref 38–73)
NRBC BLD-RTO: 0 /100 WBC
PLATELET # BLD AUTO: 197 K/UL (ref 150–450)
PLATELET, BLUE TOP: 194 K/UL (ref 150–450)
PMV BLD AUTO: 10.4 FL (ref 9.2–12.9)
RBC # BLD AUTO: 3.75 M/UL (ref 4.6–6.2)
SATURATED IRON: 25 % (ref 20–50)
TOTAL IRON BINDING CAPACITY: 306 UG/DL (ref 250–450)
TRANSFERRIN SERPL-MCNC: 207 MG/DL (ref 200–375)
VIT B12 SERPL-MCNC: 587 PG/ML (ref 210–950)
WBC # BLD AUTO: 7.16 K/UL (ref 3.9–12.7)

## 2023-02-06 PROCEDURE — 83921 ORGANIC ACID SINGLE QUANT: CPT | Performed by: INTERNAL MEDICINE

## 2023-02-06 PROCEDURE — 1159F PR MEDICATION LIST DOCUMENTED IN MEDICAL RECORD: ICD-10-PCS | Mod: CPTII,S$GLB,, | Performed by: INTERNAL MEDICINE

## 2023-02-06 PROCEDURE — 83615 LACTATE (LD) (LDH) ENZYME: CPT | Mod: PN | Performed by: INTERNAL MEDICINE

## 2023-02-06 PROCEDURE — 82607 VITAMIN B-12: CPT | Performed by: INTERNAL MEDICINE

## 2023-02-06 PROCEDURE — 1101F PT FALLS ASSESS-DOCD LE1/YR: CPT | Mod: CPTII,S$GLB,, | Performed by: INTERNAL MEDICINE

## 2023-02-06 PROCEDURE — 99205 OFFICE O/P NEW HI 60 MIN: CPT | Mod: S$GLB,,, | Performed by: INTERNAL MEDICINE

## 2023-02-06 PROCEDURE — 1160F RVW MEDS BY RX/DR IN RCRD: CPT | Mod: CPTII,S$GLB,, | Performed by: INTERNAL MEDICINE

## 2023-02-06 PROCEDURE — 3075F SYST BP GE 130 - 139MM HG: CPT | Mod: CPTII,S$GLB,, | Performed by: INTERNAL MEDICINE

## 2023-02-06 PROCEDURE — 3075F PR MOST RECENT SYSTOLIC BLOOD PRESS GE 130-139MM HG: ICD-10-PCS | Mod: CPTII,S$GLB,, | Performed by: INTERNAL MEDICINE

## 2023-02-06 PROCEDURE — 3288F FALL RISK ASSESSMENT DOCD: CPT | Mod: CPTII,S$GLB,, | Performed by: INTERNAL MEDICINE

## 2023-02-06 PROCEDURE — 84466 ASSAY OF TRANSFERRIN: CPT | Performed by: INTERNAL MEDICINE

## 2023-02-06 PROCEDURE — 3078F DIAST BP <80 MM HG: CPT | Mod: CPTII,S$GLB,, | Performed by: INTERNAL MEDICINE

## 2023-02-06 PROCEDURE — 84165 PROTEIN E-PHORESIS SERUM: CPT | Mod: 26,,, | Performed by: PATHOLOGY

## 2023-02-06 PROCEDURE — 36415 COLL VENOUS BLD VENIPUNCTURE: CPT | Mod: PN | Performed by: INTERNAL MEDICINE

## 2023-02-06 PROCEDURE — 1126F AMNT PAIN NOTED NONE PRSNT: CPT | Mod: CPTII,S$GLB,, | Performed by: INTERNAL MEDICINE

## 2023-02-06 PROCEDURE — 85049 AUTOMATED PLATELET COUNT: CPT | Mod: PN | Performed by: INTERNAL MEDICINE

## 2023-02-06 PROCEDURE — 83090 ASSAY OF HOMOCYSTEINE: CPT | Performed by: INTERNAL MEDICINE

## 2023-02-06 PROCEDURE — 83010 ASSAY OF HAPTOGLOBIN QUANT: CPT | Performed by: INTERNAL MEDICINE

## 2023-02-06 PROCEDURE — 1160F PR REVIEW ALL MEDS BY PRESCRIBER/CLIN PHARMACIST DOCUMENTED: ICD-10-PCS | Mod: CPTII,S$GLB,, | Performed by: INTERNAL MEDICINE

## 2023-02-06 PROCEDURE — 86235 NUCLEAR ANTIGEN ANTIBODY: CPT | Mod: 59 | Performed by: INTERNAL MEDICINE

## 2023-02-06 PROCEDURE — 86038 ANTINUCLEAR ANTIBODIES: CPT | Performed by: INTERNAL MEDICINE

## 2023-02-06 PROCEDURE — 99205 PR OFFICE/OUTPT VISIT, NEW, LEVL V, 60-74 MIN: ICD-10-PCS | Mod: S$GLB,,, | Performed by: INTERNAL MEDICINE

## 2023-02-06 PROCEDURE — 86334 IMMUNOFIX E-PHORESIS SERUM: CPT | Mod: 26,,, | Performed by: PATHOLOGY

## 2023-02-06 PROCEDURE — 99999 PR PBB SHADOW E&M-EST. PATIENT-LVL IV: ICD-10-PCS | Mod: PBBFAC,,, | Performed by: INTERNAL MEDICINE

## 2023-02-06 PROCEDURE — 86039 ANTINUCLEAR ANTIBODIES (ANA): CPT | Performed by: INTERNAL MEDICINE

## 2023-02-06 PROCEDURE — 86334 IMMUNOFIX E-PHORESIS SERUM: CPT | Performed by: INTERNAL MEDICINE

## 2023-02-06 PROCEDURE — 82728 ASSAY OF FERRITIN: CPT | Performed by: INTERNAL MEDICINE

## 2023-02-06 PROCEDURE — 1159F MED LIST DOCD IN RCRD: CPT | Mod: CPTII,S$GLB,, | Performed by: INTERNAL MEDICINE

## 2023-02-06 PROCEDURE — 1126F PR PAIN SEVERITY QUANTIFIED, NO PAIN PRESENT: ICD-10-PCS | Mod: CPTII,S$GLB,, | Performed by: INTERNAL MEDICINE

## 2023-02-06 PROCEDURE — 85025 COMPLETE CBC W/AUTO DIFF WBC: CPT | Mod: PN | Performed by: INTERNAL MEDICINE

## 2023-02-06 PROCEDURE — 1101F PR PT FALLS ASSESS DOC 0-1 FALLS W/OUT INJ PAST YR: ICD-10-PCS | Mod: CPTII,S$GLB,, | Performed by: INTERNAL MEDICINE

## 2023-02-06 PROCEDURE — 86703 HIV-1/HIV-2 1 RESULT ANTBDY: CPT | Mod: PN | Performed by: INTERNAL MEDICINE

## 2023-02-06 PROCEDURE — 80074 ACUTE HEPATITIS PANEL: CPT | Performed by: INTERNAL MEDICINE

## 2023-02-06 PROCEDURE — 3288F PR FALLS RISK ASSESSMENT DOCUMENTED: ICD-10-PCS | Mod: CPTII,S$GLB,, | Performed by: INTERNAL MEDICINE

## 2023-02-06 PROCEDURE — 3078F PR MOST RECENT DIASTOLIC BLOOD PRESSURE < 80 MM HG: ICD-10-PCS | Mod: CPTII,S$GLB,, | Performed by: INTERNAL MEDICINE

## 2023-02-06 PROCEDURE — 99999 PR PBB SHADOW E&M-EST. PATIENT-LVL IV: CPT | Mod: PBBFAC,,, | Performed by: INTERNAL MEDICINE

## 2023-02-06 PROCEDURE — 86334 PATHOLOGIST INTERPRETATION IFE: ICD-10-PCS | Mod: 26,,, | Performed by: PATHOLOGY

## 2023-02-06 PROCEDURE — 84165 PATHOLOGIST INTERPRETATION SPE: ICD-10-PCS | Mod: 26,,, | Performed by: PATHOLOGY

## 2023-02-06 PROCEDURE — 84165 PROTEIN E-PHORESIS SERUM: CPT | Performed by: INTERNAL MEDICINE

## 2023-02-06 RX ORDER — DICLOFENAC SODIUM 10 MG/G
GEL TOPICAL
COMMUNITY
Start: 2022-10-14

## 2023-02-06 NOTE — PROGRESS NOTES
Subjective:       Name: Samson Campos Jr.  : 1940  MRN: [id number]    Chief Complaint   Patient presents with    Anemia, unspecified type     New Patient          HPI: Samson Campos Jr. is a 82 y.o. male presents for evaluation of Anemia, unspecified type (New Patient)    Patient had blood workup done on 2023 that showed a white count of 2.72 hemoglobin of 11.8 MCV of 100 and platelet count of 133.  His absolute neutrophil count was 1.1.  His blood workup done on 2022 showed a white count of 3.65 a hemoglobin of 12.3 MCV of 104 and a normal platelet count.  His absolute neutrophil count was 1.7.  Looking back at his blood workup going to 2019 the patient has been chronically anemic with a hemoglobin ranging between to 13.3-12.4.  The MCV has been elevated above 100.    His CMP TSH was normal. He takes monthly Vitamin B12 the last one  was in 2023. He denies any fever, chills, night sweets, weight loss, melena, hematochezia, hematuria. He has been having a cough with phlegm. He was started on Omnicef and prednisone on 2023.  The patient denies any history of prolonged fever chills night sweats weight loss recurrent infections easy bruising bleeding blood in the stool was black stools blood in the urine he has been complaining of diffuse arthralgia.  He denies any numbness weakness unsteady gait.  The patient drinks 2 beers a day.    Father had pancreatic cancer at the age of 72.  No other cancers in the family.    Oncology History    No history exists.        Past Medical History:   Diagnosis Date    Anticoagulant long-term use     Atrial fibrillation 2015    Cardiac pacemaker 2018    Cataract     os    Edema 2015    Fractures     Hypertension     Nuclear sclerosis 3/11/2015    Obstructive sleep apnea 2016    no cpap    Unspecified essential hypertension 2015    Venous insufficiency 2016       Past Surgical History:   Procedure Laterality  Date    ABLATION OF ARRHYTHMOGENIC FOCUS FOR ATRIAL FIBRILLATION  2018    Procedure: Ablation atrial fibrillation;  Surgeon: Saud Julian III, MD;  Location: [facility] CATH;  Service: Radiology;;    ABLATION OF ARRHYTHMOGENIC FOCUS FOR ATRIAL FIBRILLATION N/A 2018    Procedure: Ablation atrial flutter;  Surgeon: Saud Julian III, MD;  Location: [facility] CATH;  Service: Cardiology;  Laterality: N/A;    CARDIAC ELECTROPHYSIOLOGY STUDY Bilateral 2018    Procedure: EP STUDY;  Surgeon: Saud Julian III, MD;  Location: [facility] CATH;  Service: Radiology;  Laterality: Bilateral;    CARDIAC PACEMAKER PLACEMENT      CARDIOVERSION N/A 2018    Procedure: CARDIOVERSION;  Surgeon: Saud Julian III, MD;  Location: [facility];  Service: Cardiology;  Laterality: N/A;    CATARACT EXTRACTION W/  INTRAOCULAR LENS IMPLANT Right 3/11/15    By Tan    CHOLECYSTECTOMY      COLONOSCOPY      HERNIA REPAIR      umbilical    JOINT REPLACEMENT Right     Knee    knee replacment\      ROTATOR CUFF REPAIR      TREATMENT OF CARDIAC ARRHYTHMIA  2018    Procedure: Cardioversion;  Surgeon: Saud Julian III, MD;  Location: [facility];  Service: Radiology;;       Family History   Problem Relation Age of Onset    Cataracts Mother     Cancer Father     Amblyopia Neg Hx     Blindness Neg Hx     Diabetes Neg Hx     Glaucoma Neg Hx     Hypertension Neg Hx     Macular degeneration Neg Hx     Retinal detachment Neg Hx     Strabismus Neg Hx     Stroke Neg Hx     Thyroid disease Neg Hx        Social History     Socioeconomic History    Marital status:    Tobacco Use    Smoking status: Former     Types: Cigarettes     Quit date: 1987     Years since quittin.6    Smokeless tobacco: Never    Tobacco comments:     quit 30 years   Substance and Sexual Activity    Alcohol use: Yes     Alcohol/week: 14.0 standard drinks     Types: 14 Cans of beer per week     Comment: Every other day    Drug use: No       Review of patient's  "allergies indicates:  No Known Allergies    Review of Systems   Constitutional:  Negative for appetite change, fatigue and fever.   HENT:  Negative for facial swelling, mouth sores, postnasal drip and sore throat.    Eyes:  Negative for photophobia and visual disturbance.   Respiratory:  Positive for cough. Negative for chest tightness.    Cardiovascular:  Negative for chest pain and leg swelling.   Gastrointestinal:  Negative for abdominal pain, blood in stool, nausea and vomiting.   Endocrine: Negative for cold intolerance and polyuria.   Genitourinary:  Negative for dysuria, flank pain and hematuria.   Musculoskeletal:  Positive for arthralgias. Negative for back pain and joint swelling.   Integumentary:  Negative for color change and mole/lesion.   Neurological:  Negative for dizziness, weakness, numbness, headaches and memory loss.   Hematological:  Negative for adenopathy. Does not bruise/bleed easily.   Psychiatric/Behavioral:  Negative for decreased concentration. The patient is not nervous/anxious.           Objective:     Vitals:    02/06/23 1313   BP: 136/75   BP Location: Left arm   Patient Position: Sitting   BP Method: Medium (Automatic)   Pulse: 80   Resp: 16   Temp: 97.6 °F (36.4 °C)   TempSrc: Temporal   SpO2: 98%   Weight: 100.3 kg (221 lb 1.9 oz)   Height: 5' 10" (1.778 m)        Physical Exam  Vitals reviewed.   Constitutional:       Appearance: Normal appearance.   HENT:      Head: Normocephalic and atraumatic.      Mouth/Throat:      Mouth: Mucous membranes are moist.      Pharynx: No oropharyngeal exudate.   Eyes:      General: No scleral icterus.     Pupils: Pupils are equal, round, and reactive to light.   Cardiovascular:      Rate and Rhythm: Normal rate and regular rhythm.      Pulses: Normal pulses.      Heart sounds: Normal heart sounds.   Pulmonary:      Effort: Pulmonary effort is normal.      Breath sounds: Normal breath sounds. No wheezing.   Abdominal:      General: Bowel sounds are " normal. There is no distension.      Tenderness: There is no abdominal tenderness.   Musculoskeletal:         General: No swelling or tenderness.      Cervical back: Neck supple.   Lymphadenopathy:      Cervical: No cervical adenopathy.   Skin:     Coloration: Skin is not jaundiced.      Findings: No bruising or rash.   Neurological:      General: No focal deficit present.      Mental Status: He is alert and oriented to person, place, and time.      Cranial Nerves: No cranial nerve deficit.      Motor: No weakness.      Gait: Gait normal.   Psychiatric:         Mood and Affect: Mood normal.         Behavior: Behavior normal.              Current Outpatient Medications on File Prior to Visit   Medication Sig    aspirin (ECOTRIN) 81 MG EC tablet TAKE 1 TABLET ONE TIME DAILY    cefdinir (OMNICEF) 300 MG capsule Take 1 capsule (300 mg total) by mouth 2 (two) times daily. for 10 days    cyanocobalamin 1,000 mcg/mL injection INJECT 1 ML AS DIRECTED EVERY 30 DAYS    diclofenac sodium (VOLTAREN) 1 % Gel     furosemide (LASIX) 20 MG tablet TAKE 1 TABLET (20 MG TOTAL) BY MOUTH ONCE DAILY.    metoprolol succinate (TOPROL-XL) 50 MG 24 hr tablet TAKE 1 TABLET TWICE DAILY    mupirocin (BACTROBAN) 2 % ointment Apply topically 2 (two) times daily.    predniSONE (DELTASONE) 20 MG tablet Take 1 tablet (20 mg total) by mouth 2 (two) times daily. for 5 days    traMADoL (ULTRAM) 50 mg tablet      No current facility-administered medications on file prior to visit.       CBC:  Lab Results   Component Value Date    WBC 2.72 (L) 02/01/2023    HGB 11.8 (L) 02/01/2023    HCT 33.8 (L) 02/01/2023     (H) 02/01/2023     (L) 02/01/2023         CMP:  Sodium   Date Value Ref Range Status   02/01/2023 137 136 - 145 mmol/L Final   09/30/2015 138 137 - 145 MMOL/L Final     Potassium   Date Value Ref Range Status   02/01/2023 4.3 3.5 - 5.1 mmol/L Final   09/30/2015 4.3 3.5 - 5.1 MMOL/L Final     Chloride   Date Value Ref Range Status    02/01/2023 103 95 - 110 mmol/L Final   09/30/2015 105 98 - 107 MMOL/L Final     CO2   Date Value Ref Range Status   02/01/2023 31 22 - 31 mmol/L Final     Glucose   Date Value Ref Range Status   02/01/2023 109 70 - 110 mg/dL Final     Comment:     The ADA recommends the following guidelines for fasting glucose:    Normal:       less than 100 mg/dL    Prediabetes:  100 mg/dL to 125 mg/dL    Diabetes:     126 mg/dL or higher       BUN   Date Value Ref Range Status   02/01/2023 22 (H) 9 - 21 mg/dL Final     Creatinine   Date Value Ref Range Status   02/01/2023 1.26 0.50 - 1.40 mg/dL Final   09/30/2015 1.46 (H) 0.66 - 1.25 MG/DL Final     Calcium   Date Value Ref Range Status   02/01/2023 8.6 8.4 - 10.2 mg/dL Final     Total Protein   Date Value Ref Range Status   02/01/2023 6.3 6.0 - 8.4 g/dL Final     Albumin   Date Value Ref Range Status   02/01/2023 3.8 3.5 - 5.2 g/dL Final   08/21/2015 3.9 3.5 - 5.0 G/DL Final     Total Bilirubin   Date Value Ref Range Status   02/01/2023 1.1 0.2 - 1.3 mg/dL Final     Alkaline Phosphatase   Date Value Ref Range Status   02/01/2023 64 38 - 145 U/L Final     AST ([place])   Date Value Ref Range Status   02/24/2016 17 17 - 59 U/L Final     AST   Date Value Ref Range Status   02/01/2023 22 17 - 59 U/L Final     ALT   Date Value Ref Range Status   02/01/2023 14 0 - 50 U/L Final     Anion Gap   Date Value Ref Range Status   02/01/2023 3 (L) 8 - 16 mmol/L Final     eGFR if    Date Value Ref Range Status   07/26/2022 >60 >60 mL/min/1.73 m^2 Final     eGFR if non    Date Value Ref Range Status   07/26/2022 58 (A) >60 mL/min/1.73 m^2 Final     Comment:     Calculation used to obtain the estimated glomerular filtration  rate (eGFR) is the CKD-EPI equation.          CV Ultrasound doppler venous legs bilat  · Significant reflux is noted involving the left GSV above and below the   knee.          ECOG SCORE    1 - Restricted in strenuous  activity-ambulatory and able to carry out work of a light nature              Assessment/Plan:       1- Leukopenia:  -I reviewed independently the patient medical record and discussed his blood workup over the years with him.  Most likely the his low white blood cell count is related to his recent upper respiratory tract infection and likely related to blood related disorder.  Burning in view of his macrocytosis a further workup will be done to assess if he has an underlying myelodysplastic syndrome.  He will have blood workup drawn for vitamin B12 SPEP with immunofixation LDH ESR haptoglobin methylmalonic acid homocystine level, hepatitis profile and HIV.    2-Macrocytic anemia:  -could be secondary to his vitamin B12 deficiency.  The patient stated that he missed 1 injection but he is back on track.  Other etiologies could include a myelodysplastic syndrome fatty liver hemolysis.  This will be evaluated with an abdominal ultrasound to assess his liver and spleen size as well blood workup that will include vitamin B12 methylmalonic acid homocystine haptoglobin ESR LDH.    3-Thrombocytopenia:  -often new onset.  This could be related to a pseudo thrombocytopenia versus a real thrombocytopenia.  He will have a CBC drawn on a blue top.  He also will have a hepatitis profile TOMMY SPEP with immunofixation and HIV drawn.    4-Sick sinus syndrome status post pacemaker placement:  -patient currently is taking aspirin and beta blocker.  His condition seems controlled.    5-Obesity:  BMI 31.7  -he was advised to exercise maintain healthy lifestyle and to lose weight.           Med Onc Chart Routing      Follow up with physician 2 weeks. to discuss the results of the blood work-up:   Follow up with TARA    Infusion scheduling note    Injection scheduling note    Labs Ferritin, iron and TIBC, LDH, SPEP and vitamin B12   Lab interval:  CBC, plat on a blue top, IF, hepatitis panel, HIV, TOMMY , haptoglobin, methylmalonic acid,  homocystein, ESR   Imaging    Pharmacy appointment    Other referrals         Plan was discussed with the patient at length, and he verbalized understanding. Samson was given an opportunity to ask questions that were answered to his satisfaction, and he was advised to call in the interval if any problems or questions arise.      Signed:  Linda Cabello MD   Hematology and Oncology  [facility] - HEMATOLOGY ONCOLOGY  [facility], [place]

## 2023-02-07 LAB
ALBUMIN SERPL ELPH-MCNC: 4 G/DL (ref 3.35–5.55)
ALPHA1 GLOB SERPL ELPH-MCNC: 0.28 G/DL (ref 0.17–0.41)
ALPHA2 GLOB SERPL ELPH-MCNC: 0.75 G/DL (ref 0.43–0.99)
ANA PATTERN 1: NORMAL
ANA PATTERN 2: NORMAL
ANA SER QL IF: POSITIVE
ANA TITER 2: NORMAL
ANA TITR SER IF: NORMAL {TITER}
B-GLOBULIN SERPL ELPH-MCNC: 0.69 G/DL (ref 0.5–1.1)
GAMMA GLOB SERPL ELPH-MCNC: 1.08 G/DL (ref 0.67–1.58)
INTERPRETATION SERPL IFE-IMP: NORMAL
PATHOLOGIST INTERPRETATION IFE: NORMAL
PATHOLOGIST INTERPRETATION SPE: NORMAL
PROT SERPL-MCNC: 6.8 G/DL (ref 6–8.4)

## 2023-02-08 ENCOUNTER — TELEPHONE (OUTPATIENT)
Dept: HEMATOLOGY/ONCOLOGY | Facility: CLINIC | Age: 83
End: 2023-02-08
Payer: MEDICARE

## 2023-02-08 LAB
ANTI SM ANTIBODY: 0.07 RATIO (ref 0–0.99)
ANTI SM/RNP ANTIBODY: 0.07 RATIO (ref 0–0.99)
ANTI-SM INTERPRETATION: NEGATIVE
ANTI-SM/RNP INTERPRETATION: NEGATIVE
ANTI-SSA ANTIBODY: 0.07 RATIO (ref 0–0.99)
ANTI-SSA INTERPRETATION: NEGATIVE
ANTI-SSB ANTIBODY: 0.07 RATIO (ref 0–0.99)
ANTI-SSB INTERPRETATION: NEGATIVE
DSDNA AB SER-ACNC: NORMAL [IU]/ML

## 2023-02-08 NOTE — TELEPHONE ENCOUNTER
Noted in chart----- Message from Liyah Claire sent at 2/8/2023  4:21 PM CST -----  Contact: pt  Type:  Needs Medical Advice    Who Called: Lindsey    Additional Information: Referral approved please advise thank you

## 2023-02-10 LAB — METHYLMALONATE SERPL-SCNC: 0.27 UMOL/L

## 2023-02-16 ENCOUNTER — HOSPITAL ENCOUNTER (OUTPATIENT)
Dept: RADIOLOGY | Facility: HOSPITAL | Age: 83
Discharge: HOME | End: 2023-02-16
Attending: INTERNAL MEDICINE
Payer: MEDICARE

## 2023-02-16 DIAGNOSIS — D69.6 THROMBOCYTOPENIA: ICD-10-CM

## 2023-02-16 PROCEDURE — 76700 US ABDOMEN COMPLETE: ICD-10-PCS | Mod: 26,,, | Performed by: RADIOLOGY

## 2023-02-16 PROCEDURE — 76700 US EXAM ABDOM COMPLETE: CPT | Mod: TC,PO

## 2023-02-16 PROCEDURE — 76700 US EXAM ABDOM COMPLETE: CPT | Mod: 26,,, | Performed by: RADIOLOGY

## 2023-02-27 NOTE — PROGRESS NOTES
Subjective:       Name: Samson Campos Jr.  : 1940  MRN: [id number]    Chief Complaint   Patient presents with    Anemia     2 week follow up with labs          HPI: Samson Campos Jr. is a 82 y.o. male presents to discuss the evaluation done to assess his Anemia (2 week follow up with labs)      He had blood workup drawn on 2023 that showed the presence of a free lambda light chain specific monoclonal band with an additional faint IgM lambda on his SPEP with immunofixation.  His M spike in the gamma area was quantified at 0.33 gram/deciliter.  His TOMMY came back falsely positive.  His CBC showed a white count of 7.16 hemoglobin of 12.8 and a platelet count of 197.  This has improved from his recent blood workup done on 2023 that showed a white count of 2.72 hemoglobin of 11.8 and MCV of 100 and platelet count of 133.  At that time he was diagnosed with an upper respiratory tract infection.  He was treated with Omnicef prednisone.  His platelet count on a blue to came back normal at 194.  His ferritin was elevated at 654.  His iron studies vitamin B12 methylmalonic acid LDH homocystine level haptoglobin hepatitis panel rapid HIV came back normal.       Abdominal ultrasound done on 2023 showed  1. Renal cortical thinning bilaterally as can be seen with chronic medical renal disease.  2. Slightly coarse echotexture the liver, please correlate for possible liver disease.  3. Common bile duct of 8 mm likely relating to the patient's history of cholecystectomy.  4. Atherosclerotic aortic irregularity      His bronchitis has improved.  The patient denies any fever chills night sweats weight loss nausea vomiting abdominal pain.      Oncology History    No history exists.        Past Medical History:   Diagnosis Date    Anticoagulant long-term use     Atrial fibrillation 2015    Cardiac pacemaker 2018    Cataract     os    Edema 2015    Fractures     Hypertension      Nuclear sclerosis 3/11/2015    Obstructive sleep apnea 2016    no cpap    Unspecified essential hypertension 2015    Venous insufficiency 2016       Past Surgical History:   Procedure Laterality Date    ABLATION OF ARRHYTHMOGENIC FOCUS FOR ATRIAL FIBRILLATION  2018    Procedure: Ablation atrial fibrillation;  Surgeon: Saud Julian III, MD;  Location: [facility] CATH;  Service: Radiology;;    ABLATION OF ARRHYTHMOGENIC FOCUS FOR ATRIAL FIBRILLATION N/A 2018    Procedure: Ablation atrial flutter;  Surgeon: Saud Julian III, MD;  Location: [facility] CATH;  Service: Cardiology;  Laterality: N/A;    CARDIAC ELECTROPHYSIOLOGY STUDY Bilateral 2018    Procedure: EP STUDY;  Surgeon: Saud Julian III, MD;  Location: [facility] CATH;  Service: Radiology;  Laterality: Bilateral;    CARDIAC PACEMAKER PLACEMENT      CARDIOVERSION N/A 2018    Procedure: CARDIOVERSION;  Surgeon: Saud Julian III, MD;  Location: [facility];  Service: Cardiology;  Laterality: N/A;    CATARACT EXTRACTION W/  INTRAOCULAR LENS IMPLANT Right 3/11/15    By Tan    CHOLECYSTECTOMY      COLONOSCOPY      HERNIA REPAIR      umbilical    JOINT REPLACEMENT Right     Knee    knee replacment\      ROTATOR CUFF REPAIR      TREATMENT OF CARDIAC ARRHYTHMIA  2018    Procedure: Cardioversion;  Surgeon: Saud Julian III, MD;  Location: [facility] CATH;  Service: Radiology;;       Family History   Problem Relation Age of Onset    Cataracts Mother     Cancer Father     Amblyopia Neg Hx     Blindness Neg Hx     Diabetes Neg Hx     Glaucoma Neg Hx     Hypertension Neg Hx     Macular degeneration Neg Hx     Retinal detachment Neg Hx     Strabismus Neg Hx     Stroke Neg Hx     Thyroid disease Neg Hx        Social History     Socioeconomic History    Marital status:    Tobacco Use    Smoking status: Former     Types: Cigarettes     Quit date: 1987     Years since quittin.7    Smokeless tobacco: Never    Tobacco comments:      "quit 30 years   Substance and Sexual Activity    Alcohol use: Yes     Alcohol/week: 14.0 standard drinks     Types: 14 Cans of beer per week     Comment: Every other day    Drug use: No       Review of patient's allergies indicates:  No Known Allergies    Review of Systems   Constitutional:  Negative for appetite change, fatigue and fever.   HENT:  Negative for facial swelling, mouth sores, postnasal drip and sore throat.    Eyes:  Negative for photophobia and visual disturbance.   Respiratory:  Positive for cough. Negative for chest tightness.    Cardiovascular:  Negative for chest pain and leg swelling.   Gastrointestinal:  Negative for abdominal pain, blood in stool, nausea and vomiting.   Endocrine: Negative for cold intolerance and polyuria.   Genitourinary:  Negative for dysuria, flank pain and hematuria.   Musculoskeletal:  Positive for arthralgias. Negative for back pain and joint swelling.   Integumentary:  Negative for color change and mole/lesion.   Neurological:  Negative for dizziness, weakness, numbness, headaches and memory loss.   Hematological:  Negative for adenopathy. Does not bruise/bleed easily.   Psychiatric/Behavioral:  Negative for decreased concentration. The patient is not nervous/anxious.           Objective:     Vitals:    02/28/23 1415   BP: 120/72   BP Location: Left arm   Patient Position: Sitting   BP Method: Medium (Automatic)   Pulse: 75   Resp: 18   Temp: 97.5 °F (36.4 °C)   TempSrc: Temporal   SpO2: 96%   Weight: 100.7 kg (222 lb 0.1 oz)   Height: 5' 10" (1.778 m)          Physical Exam  Vitals reviewed.   Constitutional:       Appearance: Normal appearance.   HENT:      Head: Normocephalic and atraumatic.      Mouth/Throat:      Mouth: Mucous membranes are moist.      Pharynx: No oropharyngeal exudate.   Eyes:      General: No scleral icterus.     Pupils: Pupils are equal, round, and reactive to light.   Cardiovascular:      Rate and Rhythm: Normal rate and regular rhythm.      " Pulses: Normal pulses.      Heart sounds: Normal heart sounds.   Pulmonary:      Effort: Pulmonary effort is normal.      Breath sounds: Normal breath sounds. No wheezing.   Abdominal:      General: Bowel sounds are normal. There is no distension.      Tenderness: There is no abdominal tenderness.   Musculoskeletal:         General: No swelling or tenderness.      Cervical back: Neck supple.   Lymphadenopathy:      Cervical: No cervical adenopathy.   Skin:     Coloration: Skin is not jaundiced.      Findings: No bruising or rash.   Neurological:      General: No focal deficit present.      Mental Status: He is alert and oriented to person, place, and time.      Cranial Nerves: No cranial nerve deficit.      Motor: No weakness.      Gait: Gait normal.   Psychiatric:         Mood and Affect: Mood normal.         Behavior: Behavior normal.              Current Outpatient Medications on File Prior to Visit   Medication Sig    aspirin (ECOTRIN) 81 MG EC tablet TAKE 1 TABLET ONE TIME DAILY    cyanocobalamin 1,000 mcg/mL injection INJECT 1 ML AS DIRECTED EVERY 30 DAYS    diclofenac sodium (VOLTAREN) 1 % Gel     docosahexaenoic acid/epa (FISH OIL ORAL) Take by mouth.    furosemide (LASIX) 20 MG tablet TAKE 1 TABLET (20 MG TOTAL) BY MOUTH ONCE DAILY.    metoprolol succinate (TOPROL-XL) 50 MG 24 hr tablet TAKE 1 TABLET TWICE DAILY    mupirocin (BACTROBAN) 2 % ointment Apply topically 2 (two) times daily.    traMADoL (ULTRAM) 50 mg tablet      No current facility-administered medications on file prior to visit.       CBC:  Lab Results   Component Value Date    WBC 7.16 02/06/2023    HGB 12.8 (L) 02/06/2023    HCT 36.9 (L) 02/06/2023    MCV 98 02/06/2023     02/06/2023         CMP:  Sodium   Date Value Ref Range Status   02/01/2023 137 136 - 145 mmol/L Final   09/30/2015 138 137 - 145 MMOL/L Final     Potassium   Date Value Ref Range Status   02/01/2023 4.3 3.5 - 5.1 mmol/L Final   09/30/2015 4.3 3.5 - 5.1 MMOL/L Final      Chloride   Date Value Ref Range Status   02/01/2023 103 95 - 110 mmol/L Final   09/30/2015 105 98 - 107 MMOL/L Final     CO2   Date Value Ref Range Status   02/01/2023 31 22 - 31 mmol/L Final     Glucose   Date Value Ref Range Status   02/01/2023 109 70 - 110 mg/dL Final     Comment:     The ADA recommends the following guidelines for fasting glucose:    Normal:       less than 100 mg/dL    Prediabetes:  100 mg/dL to 125 mg/dL    Diabetes:     126 mg/dL or higher       BUN   Date Value Ref Range Status   02/01/2023 22 (H) 9 - 21 mg/dL Final     Creatinine   Date Value Ref Range Status   02/01/2023 1.26 0.50 - 1.40 mg/dL Final   09/30/2015 1.46 (H) 0.66 - 1.25 MG/DL Final     Calcium   Date Value Ref Range Status   02/01/2023 8.6 8.4 - 10.2 mg/dL Final     Total Protein   Date Value Ref Range Status   02/01/2023 6.3 6.0 - 8.4 g/dL Final     Albumin   Date Value Ref Range Status   02/01/2023 3.8 3.5 - 5.2 g/dL Final   08/21/2015 3.9 3.5 - 5.0 G/DL Final     Total Bilirubin   Date Value Ref Range Status   02/01/2023 1.1 0.2 - 1.3 mg/dL Final     Alkaline Phosphatase   Date Value Ref Range Status   02/01/2023 64 38 - 145 U/L Final     AST ([place])   Date Value Ref Range Status   02/24/2016 17 17 - 59 U/L Final     AST   Date Value Ref Range Status   02/01/2023 22 17 - 59 U/L Final     ALT   Date Value Ref Range Status   02/01/2023 14 0 - 50 U/L Final     Anion Gap   Date Value Ref Range Status   02/01/2023 3 (L) 8 - 16 mmol/L Final     eGFR if    Date Value Ref Range Status   07/26/2022 >60 >60 mL/min/1.73 m^2 Final     eGFR if non    Date Value Ref Range Status   07/26/2022 58 (A) >60 mL/min/1.73 m^2 Final     Comment:     Calculation used to obtain the estimated glomerular filtration  rate (eGFR) is the CKD-EPI equation.          US Abdomen Complete  Narrative: EXAMINATION:  US ABDOMEN COMPLETE    CLINICAL HISTORY:  thrombocytopenia; Thrombocytopenia,  unspecified    TECHNIQUE:  Complete abdominal ultrasound.    COMPARISON:  None    FINDINGS:  The pancreas demonstrates no obvious abnormality in its visualized portions.  The aorta demonstrates no obvious evidence of aneurysm in its visualized portions, irregularity is noted to the distal aorta as can be seen with atherosclerotic calcifications.  The inferior vena cava appears patent at the level of the liver.  A common bile duct is enlarged at 8 mm with evidence of cholecystectomy.  No obvious intrahepatic biliary dilatation is noted.  The liver measures 15.0 cm and demonstrates a coarse echotexture as can be seen with liver disease.  Please clinically correlate.  The main portal vein appears patent.  The hepatic confluence is suboptimally displayed, no obvious abnormality is noted in its partially visualized portion.  The right kidney measures 11.5 cm and demonstrates cortical thinning as can be seen with chronic medical renal disease.  The spleen measures 11.8 cm and demonstrates no evidence of obstruction.  The left kidney measures 11.6 cm and demonstrates cortical thinning as can be seen with chronic medical renal disease.  Impression: 1. Renal cortical thinning bilaterally as can be seen with chronic medical renal disease.  2. Slightly coarse echotexture the liver, please correlate for possible liver disease.  3. Common bile duct of 8 mm likely relating to the patient's history of cholecystectomy.  4. 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  He was made aware that his blood workup showed that he has an abnormal SPEP with immunofixation.  This will require to be further evaluated  with blood workup that will include beta 2 microglobulin quantitative immunoglobulins and free light chain.  He also will be scheduled to undergo a 24 hour urine collection for UPEP with immunofixation.  He will be seen back again in 2 weeks to discuss the results of the workup in the next step in his management.  This abnormality could resolve by itself or could evolve into an underlying blood related dyscrasia.      2- Leukopenia:  -I reviewed independently the patient medical record and discussed his blood workup. His WBC came back normal  at 7.16. Most likely the his low white blood cell count is related to his recent upper respiratory tract infection and less likely related to blood related disorder.        3-Macrocytic anemia:  -His repeat blood workup showed an improvement of his anemia at 12.8 with a hematocrit of 36.9 and an MCV of 98.  His repeat vitamin B12 homocystine level and methylmalonic acid came back normal.  His workup was only significant for an abnormal SPEP with immunofixation.    His abdominal ultrasound done to assess his liver and spleen size came back normal. He has a fatty liver.    4-Thrombocytopenia:  resolved.  This was due to a pseudo thrombocytopenia His repeat  CBC drawn on a blue top showed a normal plat count at 197.    5-Sick sinus syndrome status post pacemaker placement:  -patient currently is taking aspirin and beta blocker.  His condition seems controlled.    6-Obesity:  BMI 31.7  -he was advised to exercise maintain healthy lifestyle and to lose weight.           Med Onc Chart Routing      Follow up with physician 2 weeks. to discuss the results of blood work-up   Follow up with TARA    Infusion scheduling note    Injection scheduling note    Labs Free light chains   Lab interval:  UPEP and IF, beta2 microglobulin, and quantitative immunoglobulins.   Imaging    Pharmacy appointment    Other referrals         Plan was discussed with the patient at length, and he verbalized  understanding. Samson was given an opportunity to ask questions that were answered to his satisfaction, and he was advised to call in the interval if any problems or questions arise.      Signed:  Linda Cabello MD   Hematology and Oncology  [facility] - HEMATOLOGY ONCOLOGY  [facility], [place]

## 2023-02-28 ENCOUNTER — OFFICE VISIT (OUTPATIENT)
Dept: HEMATOLOGY/ONCOLOGY | Facility: CLINIC | Age: 83
End: 2023-02-28
Payer: MEDICARE

## 2023-02-28 ENCOUNTER — LAB VISIT (OUTPATIENT)
Dept: LAB | Facility: HOSPITAL | Age: 83
End: 2023-02-28
Attending: INTERNAL MEDICINE
Payer: MEDICARE

## 2023-02-28 VITALS
WEIGHT: 222 LBS | HEIGHT: 70 IN | OXYGEN SATURATION: 96 % | TEMPERATURE: 98 F | BODY MASS INDEX: 31.78 KG/M2 | RESPIRATION RATE: 18 BRPM | HEART RATE: 75 BPM | DIASTOLIC BLOOD PRESSURE: 72 MMHG | SYSTOLIC BLOOD PRESSURE: 120 MMHG

## 2023-02-28 DIAGNOSIS — D75.89 MACROCYTOSIS: ICD-10-CM

## 2023-02-28 DIAGNOSIS — D47.2 MGUS (MONOCLONAL GAMMOPATHY OF UNKNOWN SIGNIFICANCE): ICD-10-CM

## 2023-02-28 DIAGNOSIS — D47.2 MGUS (MONOCLONAL GAMMOPATHY OF UNKNOWN SIGNIFICANCE): Primary | ICD-10-CM

## 2023-02-28 DIAGNOSIS — D69.6 THROMBOCYTOPENIA: ICD-10-CM

## 2023-02-28 DIAGNOSIS — D51.8 VITAMIN B12 DEFICIENCY (DIETARY) ANEMIA: ICD-10-CM

## 2023-02-28 DIAGNOSIS — D72.819 LEUKOPENIA, UNSPECIFIED TYPE: ICD-10-CM

## 2023-02-28 DIAGNOSIS — E66.9 OBESITY (BMI 30.0-34.9): ICD-10-CM

## 2023-02-28 DIAGNOSIS — D51.8 OTHER VITAMIN B12 DEFICIENCY ANEMIA: ICD-10-CM

## 2023-02-28 LAB
B2 MICROGLOB SERPL-MCNC: 4.1 UG/ML (ref 0–2.5)
IGA SERPL-MCNC: 261 MG/DL (ref 40–350)
IGG SERPL-MCNC: 989 MG/DL (ref 650–1600)
IGM SERPL-MCNC: 123 MG/DL (ref 50–300)

## 2023-02-28 PROCEDURE — 1101F PT FALLS ASSESS-DOCD LE1/YR: CPT | Mod: CPTII,S$GLB,, | Performed by: INTERNAL MEDICINE

## 2023-02-28 PROCEDURE — 99215 OFFICE O/P EST HI 40 MIN: CPT | Mod: S$GLB,,, | Performed by: INTERNAL MEDICINE

## 2023-02-28 PROCEDURE — 83521 IG LIGHT CHAINS FREE EACH: CPT | Mod: 59 | Performed by: INTERNAL MEDICINE

## 2023-02-28 PROCEDURE — 1159F MED LIST DOCD IN RCRD: CPT | Mod: CPTII,S$GLB,, | Performed by: INTERNAL MEDICINE

## 2023-02-28 PROCEDURE — 99999 PR PBB SHADOW E&M-EST. PATIENT-LVL III: ICD-10-PCS | Mod: PBBFAC,,, | Performed by: INTERNAL MEDICINE

## 2023-02-28 PROCEDURE — 3288F FALL RISK ASSESSMENT DOCD: CPT | Mod: CPTII,S$GLB,, | Performed by: INTERNAL MEDICINE

## 2023-02-28 PROCEDURE — 3074F SYST BP LT 130 MM HG: CPT | Mod: CPTII,S$GLB,, | Performed by: INTERNAL MEDICINE

## 2023-02-28 PROCEDURE — 3074F PR MOST RECENT SYSTOLIC BLOOD PRESSURE < 130 MM HG: ICD-10-PCS | Mod: CPTII,S$GLB,, | Performed by: INTERNAL MEDICINE

## 2023-02-28 PROCEDURE — 1126F PR PAIN SEVERITY QUANTIFIED, NO PAIN PRESENT: ICD-10-PCS | Mod: CPTII,S$GLB,, | Performed by: INTERNAL MEDICINE

## 2023-02-28 PROCEDURE — 1159F PR MEDICATION LIST DOCUMENTED IN MEDICAL RECORD: ICD-10-PCS | Mod: CPTII,S$GLB,, | Performed by: INTERNAL MEDICINE

## 2023-02-28 PROCEDURE — 1126F AMNT PAIN NOTED NONE PRSNT: CPT | Mod: CPTII,S$GLB,, | Performed by: INTERNAL MEDICINE

## 2023-02-28 PROCEDURE — 3078F PR MOST RECENT DIASTOLIC BLOOD PRESSURE < 80 MM HG: ICD-10-PCS | Mod: CPTII,S$GLB,, | Performed by: INTERNAL MEDICINE

## 2023-02-28 PROCEDURE — 82784 ASSAY IGA/IGD/IGG/IGM EACH: CPT | Performed by: INTERNAL MEDICINE

## 2023-02-28 PROCEDURE — 1160F PR REVIEW ALL MEDS BY PRESCRIBER/CLIN PHARMACIST DOCUMENTED: ICD-10-PCS | Mod: CPTII,S$GLB,, | Performed by: INTERNAL MEDICINE

## 2023-02-28 PROCEDURE — 1101F PR PT FALLS ASSESS DOC 0-1 FALLS W/OUT INJ PAST YR: ICD-10-PCS | Mod: CPTII,S$GLB,, | Performed by: INTERNAL MEDICINE

## 2023-02-28 PROCEDURE — 82232 ASSAY OF BETA-2 PROTEIN: CPT | Performed by: INTERNAL MEDICINE

## 2023-02-28 PROCEDURE — 3078F DIAST BP <80 MM HG: CPT | Mod: CPTII,S$GLB,, | Performed by: INTERNAL MEDICINE

## 2023-02-28 PROCEDURE — 99999 PR PBB SHADOW E&M-EST. PATIENT-LVL III: CPT | Mod: PBBFAC,,, | Performed by: INTERNAL MEDICINE

## 2023-02-28 PROCEDURE — 1160F RVW MEDS BY RX/DR IN RCRD: CPT | Mod: CPTII,S$GLB,, | Performed by: INTERNAL MEDICINE

## 2023-02-28 PROCEDURE — 36415 COLL VENOUS BLD VENIPUNCTURE: CPT | Mod: PN | Performed by: INTERNAL MEDICINE

## 2023-02-28 PROCEDURE — 3288F PR FALLS RISK ASSESSMENT DOCUMENTED: ICD-10-PCS | Mod: CPTII,S$GLB,, | Performed by: INTERNAL MEDICINE

## 2023-02-28 PROCEDURE — 99215 PR OFFICE/OUTPT VISIT, EST, LEVL V, 40-54 MIN: ICD-10-PCS | Mod: S$GLB,,, | Performed by: INTERNAL MEDICINE

## 2023-03-01 LAB
KAPPA LC SER QL IA: 4.68 MG/DL (ref 0.33–1.94)
KAPPA LC/LAMBDA SER IA: 1.39 (ref 0.26–1.65)
LAMBDA LC SER QL IA: 3.36 MG/DL (ref 0.57–2.63)

## 2023-03-16 NOTE — PROGRESS NOTES
Subjective:       Name: Samson Campos Jr.  : 1940  MRN: [id number]    Chief Complaint   Patient presents with    MGUS (monoclonal gammopathy of unknown significance)     2 week follow up            HPI: Samson Campos Jr. is a 82 y.o. male presents to discuss the evaluation done to assess his MGUS (monoclonal gammopathy of unknown significance) (2 week follow up/)      He had blood workup drawn on 2023 that showed the presence of a free lambda light chain specific monoclonal band with an additional faint IgM lambda on his SPEP with immunofixation.  His M spike in the gamma area was quantified at 0.33 gram/deciliter.    His blood workup done on 2023 showed that his quantitative immunoglobulins came back normal.  Free light chain ratio came back normal.  The beta 2 microglobulin was elevated at 4.1.     Abdominal ultrasound done on 2023 showed  1. Renal cortical thinning bilaterally as can be seen with chronic medical renal disease.  2. Slightly coarse echotexture the liver, please correlate for possible liver disease.  3. Common bile duct of 8 mm likely relating to the patient's history of cholecystectomy.  4. Atherosclerotic aortic irregularity      The patient denies any fever chills night sweats weight loss nausea vomiting abdominal pain.      Oncology History    No history exists.        Past Medical History:   Diagnosis Date    Anticoagulant long-term use     Atrial fibrillation 2015    Cardiac pacemaker 2018    Cataract     os    Edema 2015    Fractures     Hypertension     Nuclear sclerosis 3/11/2015    Obstructive sleep apnea 2016    no cpap    Unspecified essential hypertension 2015    Venous insufficiency 2016       Past Surgical History:   Procedure Laterality Date    ABLATION OF ARRHYTHMOGENIC FOCUS FOR ATRIAL FIBRILLATION  2018    Procedure: Ablation atrial fibrillation;  Surgeon: Saud Julian III, MD;  Location: [facility]  CATH;  Service: Radiology;;    ABLATION OF ARRHYTHMOGENIC FOCUS FOR ATRIAL FIBRILLATION N/A 2018    Procedure: Ablation atrial flutter;  Surgeon: Saud Julian III, MD;  Location: [facility] CATH;  Service: Cardiology;  Laterality: N/A;    CARDIAC ELECTROPHYSIOLOGY STUDY Bilateral 2018    Procedure: EP STUDY;  Surgeon: Saud Julian III, MD;  Location: [facility] CATH;  Service: Radiology;  Laterality: Bilateral;    CARDIAC PACEMAKER PLACEMENT      CARDIOVERSION N/A 2018    Procedure: CARDIOVERSION;  Surgeon: Saud Julian III, MD;  Location: [facility] JACOB;  Service: Cardiology;  Laterality: N/A;    CATARACT EXTRACTION W/  INTRAOCULAR LENS IMPLANT Right 3/11/15    By Tan    CHOLECYSTECTOMY      COLONOSCOPY      HERNIA REPAIR      umbilical    JOINT REPLACEMENT Right     Knee    knee replacment\      ROTATOR CUFF REPAIR      TREATMENT OF CARDIAC ARRHYTHMIA  2018    Procedure: Cardioversion;  Surgeon: Saud Julian III, MD;  Location: [facility] CATH;  Service: Radiology;;       Family History   Problem Relation Age of Onset    Cataracts Mother     Cancer Father     Amblyopia Neg Hx     Blindness Neg Hx     Diabetes Neg Hx     Glaucoma Neg Hx     Hypertension Neg Hx     Macular degeneration Neg Hx     Retinal detachment Neg Hx     Strabismus Neg Hx     Stroke Neg Hx     Thyroid disease Neg Hx        Social History     Socioeconomic History    Marital status:    Tobacco Use    Smoking status: Former     Types: Cigarettes     Quit date: 1987     Years since quittin.7    Smokeless tobacco: Never    Tobacco comments:     quit 30 years   Substance and Sexual Activity    Alcohol use: Yes     Alcohol/week: 14.0 standard drinks     Types: 14 Cans of beer per week     Comment: Every other day    Drug use: No       Review of patient's allergies indicates:  No Known Allergies    Review of Systems   Constitutional:  Negative for appetite change, fatigue and fever.   HENT:  Negative for facial swelling,  "mouth sores, postnasal drip and sore throat.    Eyes:  Negative for photophobia and visual disturbance.   Respiratory:  Positive for cough. Negative for chest tightness.    Cardiovascular:  Negative for chest pain and leg swelling.   Gastrointestinal:  Negative for abdominal pain, blood in stool, nausea and vomiting.   Endocrine: Negative for cold intolerance and polyuria.   Genitourinary:  Negative for dysuria, flank pain and hematuria.   Musculoskeletal:  Positive for arthralgias. Negative for back pain and joint swelling.   Integumentary:  Negative for color change and mole/lesion.   Neurological:  Negative for dizziness, weakness, numbness, headaches and memory loss.   Hematological:  Negative for adenopathy. Does not bruise/bleed easily.   Psychiatric/Behavioral:  Negative for decreased concentration. The patient is not nervous/anxious.           Objective:     Vitals:    03/21/23 1443   BP: 118/60   BP Location: Left arm   Patient Position: Sitting   BP Method: Medium (Manual)   Pulse: 82   Resp: 16   Temp: 97.8 °F (36.6 °C)   TempSrc: Temporal   SpO2: 97%   Weight: 100.9 kg (222 lb 7.1 oz)   Height: 5' 10" (1.778 m)            Physical Exam  Vitals reviewed.   Constitutional:       Appearance: Normal appearance.   HENT:      Head: Normocephalic and atraumatic.      Mouth/Throat:      Mouth: Mucous membranes are moist.      Pharynx: No oropharyngeal exudate.   Eyes:      General: No scleral icterus.     Pupils: Pupils are equal, round, and reactive to light.   Cardiovascular:      Rate and Rhythm: Normal rate and regular rhythm.      Pulses: Normal pulses.      Heart sounds: Normal heart sounds.   Pulmonary:      Effort: Pulmonary effort is normal.      Breath sounds: Normal breath sounds. No wheezing.   Abdominal:      General: Bowel sounds are normal. There is no distension.      Tenderness: There is no abdominal tenderness.   Musculoskeletal:         General: No swelling or tenderness.      Cervical back: " Neck supple.   Lymphadenopathy:      Cervical: No cervical adenopathy.   Skin:     Coloration: Skin is not jaundiced.      Findings: No bruising or rash.   Neurological:      General: No focal deficit present.      Mental Status: He is alert and oriented to person, place, and time.      Cranial Nerves: No cranial nerve deficit.      Motor: No weakness.      Gait: Gait normal.   Psychiatric:         Mood and Affect: Mood normal.         Behavior: Behavior normal.              Current Outpatient Medications on File Prior to Visit   Medication Sig    aspirin (ECOTRIN) 81 MG EC tablet TAKE 1 TABLET ONE TIME DAILY    calcium carbonate/vitamin D3 (VITAMIN D-3 ORAL) Take by mouth.    cyanocobalamin 1,000 mcg/mL injection INJECT 1 ML AS DIRECTED EVERY 30 DAYS    diclofenac sodium (VOLTAREN) 1 % Gel     docosahexaenoic acid/epa (FISH OIL ORAL) Take by mouth.    furosemide (LASIX) 20 MG tablet TAKE 1 TABLET (20 MG TOTAL) BY MOUTH ONCE DAILY.    metoprolol succinate (TOPROL-XL) 50 MG 24 hr tablet TAKE 1 TABLET TWICE DAILY    mupirocin (BACTROBAN) 2 % ointment APPLY TO AFFECTED AREA TWICE A DAY.    traMADoL (ULTRAM) 50 mg tablet      No current facility-administered medications on file prior to visit.       CBC:  Lab Results   Component Value Date    WBC 7.16 02/06/2023    HGB 12.8 (L) 02/06/2023    HCT 36.9 (L) 02/06/2023    MCV 98 02/06/2023     02/06/2023         CMP:  Sodium   Date Value Ref Range Status   02/01/2023 137 136 - 145 mmol/L Final   09/30/2015 138 137 - 145 MMOL/L Final     Potassium   Date Value Ref Range Status   02/01/2023 4.3 3.5 - 5.1 mmol/L Final   09/30/2015 4.3 3.5 - 5.1 MMOL/L Final     Chloride   Date Value Ref Range Status   02/01/2023 103 95 - 110 mmol/L Final   09/30/2015 105 98 - 107 MMOL/L Final     CO2   Date Value Ref Range Status   02/01/2023 31 22 - 31 mmol/L Final     Glucose   Date Value Ref Range Status   02/01/2023 109 70 - 110 mg/dL Final     Comment:     The ADA recommends the  following guidelines for fasting glucose:    Normal:       less than 100 mg/dL    Prediabetes:  100 mg/dL to 125 mg/dL    Diabetes:     126 mg/dL or higher       BUN   Date Value Ref Range Status   02/01/2023 22 (H) 9 - 21 mg/dL Final     Creatinine   Date Value Ref Range Status   02/01/2023 1.26 0.50 - 1.40 mg/dL Final   09/30/2015 1.46 (H) 0.66 - 1.25 MG/DL Final     Calcium   Date Value Ref Range Status   02/01/2023 8.6 8.4 - 10.2 mg/dL Final     Total Protein   Date Value Ref Range Status   02/01/2023 6.3 6.0 - 8.4 g/dL Final     Albumin   Date Value Ref Range Status   02/01/2023 3.8 3.5 - 5.2 g/dL Final   08/21/2015 3.9 3.5 - 5.0 G/DL Final     Total Bilirubin   Date Value Ref Range Status   02/01/2023 1.1 0.2 - 1.3 mg/dL Final     Alkaline Phosphatase   Date Value Ref Range Status   02/01/2023 64 38 - 145 U/L Final     AST ([place])   Date Value Ref Range Status   02/24/2016 17 17 - 59 U/L Final     AST   Date Value Ref Range Status   02/01/2023 22 17 - 59 U/L Final     ALT   Date Value Ref Range Status   02/01/2023 14 0 - 50 U/L Final     Anion Gap   Date Value Ref Range Status   02/01/2023 3 (L) 8 - 16 mmol/L Final     eGFR if    Date Value Ref Range Status   07/26/2022 >60 >60 mL/min/1.73 m^2 Final     eGFR if non    Date Value Ref Range Status   07/26/2022 58 (A) >60 mL/min/1.73 m^2 Final     Comment:     Calculation used to obtain the estimated glomerular filtration  rate (eGFR) is the CKD-EPI equation.          US Abdomen Complete  Narrative: EXAMINATION:  US ABDOMEN COMPLETE    CLINICAL HISTORY:  thrombocytopenia; Thrombocytopenia, unspecified    TECHNIQUE:  Complete abdominal ultrasound.    COMPARISON:  None    FINDINGS:  The pancreas demonstrates no obvious abnormality in its visualized portions.  The aorta demonstrates no obvious evidence of aneurysm in its visualized portions, irregularity is noted to the distal aorta as can be seen with atherosclerotic  calcifications.  The inferior vena cava appears patent at the level of the liver.  A common bile duct is enlarged at 8 mm with evidence of cholecystectomy.  No obvious intrahepatic biliary dilatation is noted.  The liver measures 15.0 cm and demonstrates a coarse echotexture as can be seen with liver disease.  Please clinically correlate.  The main portal vein appears patent.  The hepatic confluence is suboptimally displayed, no obvious abnormality is noted in its partially visualized portion.  The right kidney measures 11.5 cm and demonstrates cortical thinning as can be seen with chronic medical renal disease.  The spleen measures 11.8 cm and demonstrates no evidence of obstruction.  The left kidney measures 11.6 cm and demonstrates cortical thinning as can be seen with chronic medical renal disease.  Impression: 1. Renal cortical thinning bilaterally as can be seen with chronic medical renal disease.  2. Slightly coarse echotexture the liver, please correlate for possible liver disease.  3. Common bile duct of 8 mm likely relating to the patient's history of cholecystectomy.  4. Atherosclerotic aortic irregularity    Electronically signed by: Misha Johnston MD  Date:    02/16/2023  Time:    12:04       ECOG SCORE    1 - Restricted in strenuous activity-ambulatory and able to carry out work of a light nature              Assessment/Plan:         1- MGUS free lambda light lamda chain and IgM Lambda:  -I reviewed independently the patient laboratory findings.  He was made aware that his blood workup showed that he has an abnormal SPEP with immunofixation.  The blood workup that included  beta 2 microglobulin quantitative immunoglobulins and free light chain came back normal.  His  24 hour urine collection for UPEP with immunofixation was not done.    I reviewed with the patient the natural progression of MGUS. This is a premalignant condition that could either resolved or possibly progressing to a blood related  disorder.  Signs of progression of this abnormality include prolonged fever chills night sweats weight loss unintentional recurrent infection worsening of his kidney function hypercalcemia and worsening of his anemia.  The patient will continue to be monitored clinically as well with repeat blood workup.  He will be seen back again in 6 months for a follow-up visit with repeat CBC CMP ESR LDH beta 2 microglobulin  light chain quantitative immunoglobulins and SPEP with immunofixation.     2- Leukopenia:  -I reviewed independently the patient medical record and discussed his blood workup. His WBC came back normal  at 7.16. Most likely the his low white blood cell count is related to his recent upper respiratory tract infection and less likely related to blood related disorder.        3-Macrocytic anemia:  -His repeat blood workup showed an improvement of his anemia at 12.8 with a hematocrit of 36.9 and an MCV of 98.  His repeat vitamin B12 homocystine level and methylmalonic acid came back normal.  His workup was only significant for an abnormal SPEP with immunofixation.    His abdominal ultrasound done to assess his liver and spleen size came back normal. He has a fatty liver.  -Will continue to monitor his CBC    4-Thrombocytopenia:  resolved.  This was due to a pseudo thrombocytopenia His repeat  CBC drawn on a blue top showed a normal plat count at 197.    5-Sick sinus syndrome status post pacemaker placement:  -patient currently is taking aspirin and beta blocker.  His condition seems controlled.    6-Obesity:  BMI 31.92  -he was advised to exercise maintain healthy lifestyle and to lose weight.           Med Onc Chart Routing      Follow up with physician 6 months. with repeat labs. The lab need to be drawn a week before the follow-up apoitment.   Follow up with [facility]    Infusion scheduling note    Injection scheduling note    Labs CBC, CMP, SPEP, free light chains and LDH   Scheduling:  Preferred lab:  Lab  interval:  IF, quantitative  immunoglobulins, Beta 2 microglobulin, ESR   Imaging    Pharmacy appointment    Other referrals            Plan was discussed with the patient at length, and he verbalized understanding. Samson was given an opportunity to ask questions that were answered to his satisfaction, and he was advised to call in the interval if any problems or questions arise.      Signed:  Linda Cabello MD   Hematology and Oncology  [facility] - HEMATOLOGY ONCOLOGY  [facility], [place]

## 2023-03-21 ENCOUNTER — OFFICE VISIT (OUTPATIENT)
Dept: HEMATOLOGY/ONCOLOGY | Facility: CLINIC | Age: 83
End: 2023-03-21
Payer: MEDICARE

## 2023-03-21 VITALS
OXYGEN SATURATION: 97 % | RESPIRATION RATE: 16 BRPM | HEART RATE: 82 BPM | HEIGHT: 70 IN | BODY MASS INDEX: 31.85 KG/M2 | WEIGHT: 222.44 LBS | SYSTOLIC BLOOD PRESSURE: 118 MMHG | DIASTOLIC BLOOD PRESSURE: 60 MMHG | TEMPERATURE: 98 F

## 2023-03-21 DIAGNOSIS — D72.819 LEUKOPENIA, UNSPECIFIED TYPE: ICD-10-CM

## 2023-03-21 DIAGNOSIS — D47.2 MGUS (MONOCLONAL GAMMOPATHY OF UNKNOWN SIGNIFICANCE): Primary | ICD-10-CM

## 2023-03-21 DIAGNOSIS — E66.9 OBESITY (BMI 30.0-34.9): ICD-10-CM

## 2023-03-21 DIAGNOSIS — D69.6 THROMBOCYTOPENIA: ICD-10-CM

## 2023-03-21 DIAGNOSIS — D75.89 MACROCYTOSIS: ICD-10-CM

## 2023-03-21 PROCEDURE — 3074F PR MOST RECENT SYSTOLIC BLOOD PRESSURE < 130 MM HG: ICD-10-PCS | Mod: CPTII,S$GLB,, | Performed by: INTERNAL MEDICINE

## 2023-03-21 PROCEDURE — 99999 PR PBB SHADOW E&M-EST. PATIENT-LVL III: CPT | Mod: PBBFAC,,, | Performed by: INTERNAL MEDICINE

## 2023-03-21 PROCEDURE — 1126F PR PAIN SEVERITY QUANTIFIED, NO PAIN PRESENT: ICD-10-PCS | Mod: CPTII,S$GLB,, | Performed by: INTERNAL MEDICINE

## 2023-03-21 PROCEDURE — 99215 OFFICE O/P EST HI 40 MIN: CPT | Mod: S$GLB,,, | Performed by: INTERNAL MEDICINE

## 2023-03-21 PROCEDURE — 3078F PR MOST RECENT DIASTOLIC BLOOD PRESSURE < 80 MM HG: ICD-10-PCS | Mod: CPTII,S$GLB,, | Performed by: INTERNAL MEDICINE

## 2023-03-21 PROCEDURE — 1126F AMNT PAIN NOTED NONE PRSNT: CPT | Mod: CPTII,S$GLB,, | Performed by: INTERNAL MEDICINE

## 2023-03-21 PROCEDURE — 3074F SYST BP LT 130 MM HG: CPT | Mod: CPTII,S$GLB,, | Performed by: INTERNAL MEDICINE

## 2023-03-21 PROCEDURE — 3288F PR FALLS RISK ASSESSMENT DOCUMENTED: ICD-10-PCS | Mod: CPTII,S$GLB,, | Performed by: INTERNAL MEDICINE

## 2023-03-21 PROCEDURE — 1101F PT FALLS ASSESS-DOCD LE1/YR: CPT | Mod: CPTII,S$GLB,, | Performed by: INTERNAL MEDICINE

## 2023-03-21 PROCEDURE — 99215 PR OFFICE/OUTPT VISIT, EST, LEVL V, 40-54 MIN: ICD-10-PCS | Mod: S$GLB,,, | Performed by: INTERNAL MEDICINE

## 2023-03-21 PROCEDURE — 3078F DIAST BP <80 MM HG: CPT | Mod: CPTII,S$GLB,, | Performed by: INTERNAL MEDICINE

## 2023-03-21 PROCEDURE — 1101F PR PT FALLS ASSESS DOC 0-1 FALLS W/OUT INJ PAST YR: ICD-10-PCS | Mod: CPTII,S$GLB,, | Performed by: INTERNAL MEDICINE

## 2023-03-21 PROCEDURE — 1159F MED LIST DOCD IN RCRD: CPT | Mod: CPTII,S$GLB,, | Performed by: INTERNAL MEDICINE

## 2023-03-21 PROCEDURE — 3288F FALL RISK ASSESSMENT DOCD: CPT | Mod: CPTII,S$GLB,, | Performed by: INTERNAL MEDICINE

## 2023-03-21 PROCEDURE — 99999 PR PBB SHADOW E&M-EST. PATIENT-LVL III: ICD-10-PCS | Mod: PBBFAC,,, | Performed by: INTERNAL MEDICINE

## 2023-03-21 PROCEDURE — 1159F PR MEDICATION LIST DOCUMENTED IN MEDICAL RECORD: ICD-10-PCS | Mod: CPTII,S$GLB,, | Performed by: INTERNAL MEDICINE

## 2023-09-15 ENCOUNTER — LAB VISIT (OUTPATIENT)
Dept: LAB | Facility: HOSPITAL | Age: 83
End: 2023-09-15
Attending: INTERNAL MEDICINE
Payer: MEDICARE

## 2023-09-15 DIAGNOSIS — D75.89 MACROCYTOSIS: ICD-10-CM

## 2023-09-15 DIAGNOSIS — D47.2 MGUS (MONOCLONAL GAMMOPATHY OF UNKNOWN SIGNIFICANCE): ICD-10-CM

## 2023-09-15 LAB
ALBUMIN SERPL BCP-MCNC: 3.7 G/DL (ref 3.5–5.2)
ALP SERPL-CCNC: 75 U/L (ref 55–135)
ALT SERPL W/O P-5'-P-CCNC: 11 U/L (ref 10–44)
ANION GAP SERPL CALC-SCNC: 11 MMOL/L (ref 8–16)
AST SERPL-CCNC: 12 U/L (ref 10–40)
B2 MICROGLOB SERPL-MCNC: 3.5 UG/ML (ref 0–2.5)
BASOPHILS # BLD AUTO: 0.04 K/UL (ref 0–0.2)
BASOPHILS NFR BLD: 0.9 % (ref 0–1.9)
BILIRUB SERPL-MCNC: 1.4 MG/DL (ref 0.1–1)
BUN SERPL-MCNC: 17 MG/DL (ref 8–23)
CALCIUM SERPL-MCNC: 8.9 MG/DL (ref 8.7–10.5)
CHLORIDE SERPL-SCNC: 106 MMOL/L (ref 95–110)
CO2 SERPL-SCNC: 24 MMOL/L (ref 23–29)
CREAT SERPL-MCNC: 1.3 MG/DL (ref 0.5–1.4)
DIFFERENTIAL METHOD: ABNORMAL
EOSINOPHIL # BLD AUTO: 0 K/UL (ref 0–0.5)
EOSINOPHIL NFR BLD: 0.7 % (ref 0–8)
ERYTHROCYTE [DISTWIDTH] IN BLOOD BY AUTOMATED COUNT: 12.4 % (ref 11.5–14.5)
ERYTHROCYTE [SEDIMENTATION RATE] IN BLOOD BY PHOTOMETRIC METHOD: 3 MM/HR (ref 0–23)
EST. GFR  (NO RACE VARIABLE): 54.5 ML/MIN/1.73 M^2
GLUCOSE SERPL-MCNC: 141 MG/DL (ref 70–110)
HCT VFR BLD AUTO: 36 % (ref 40–54)
HGB BLD-MCNC: 12.4 G/DL (ref 14–18)
IGA SERPL-MCNC: 230 MG/DL (ref 40–350)
IGG SERPL-MCNC: 956 MG/DL (ref 650–1600)
IGM SERPL-MCNC: 83 MG/DL (ref 50–300)
IMM GRANULOCYTES # BLD AUTO: 0.02 K/UL (ref 0–0.04)
IMM GRANULOCYTES NFR BLD AUTO: 0.4 % (ref 0–0.5)
LDH SERPL L TO P-CCNC: 178 U/L (ref 110–260)
LYMPHOCYTES # BLD AUTO: 1.6 K/UL (ref 1–4.8)
LYMPHOCYTES NFR BLD: 35.3 % (ref 18–48)
MCH RBC QN AUTO: 35.7 PG (ref 27–31)
MCHC RBC AUTO-ENTMCNC: 34.4 G/DL (ref 32–36)
MCV RBC AUTO: 104 FL (ref 82–98)
MONOCYTES # BLD AUTO: 0.3 K/UL (ref 0.3–1)
MONOCYTES NFR BLD: 5.7 % (ref 4–15)
NEUTROPHILS # BLD AUTO: 2.6 K/UL (ref 1.8–7.7)
NEUTROPHILS NFR BLD: 57 % (ref 38–73)
NRBC BLD-RTO: 0 /100 WBC
PLATELET # BLD AUTO: 172 K/UL (ref 150–450)
PMV BLD AUTO: 11.1 FL (ref 9.2–12.9)
POTASSIUM SERPL-SCNC: 4.5 MMOL/L (ref 3.5–5.1)
PROT SERPL-MCNC: 6.3 G/DL (ref 6–8.4)
RBC # BLD AUTO: 3.47 M/UL (ref 4.6–6.2)
SODIUM SERPL-SCNC: 141 MMOL/L (ref 136–145)
WBC # BLD AUTO: 4.53 K/UL (ref 3.9–12.7)

## 2023-09-15 PROCEDURE — 82232 ASSAY OF BETA-2 PROTEIN: CPT | Performed by: INTERNAL MEDICINE

## 2023-09-15 PROCEDURE — 86334 PATHOLOGIST INTERPRETATION IFE: ICD-10-PCS | Mod: 26,,, | Performed by: PATHOLOGY

## 2023-09-15 PROCEDURE — 84165 PROTEIN E-PHORESIS SERUM: CPT | Performed by: INTERNAL MEDICINE

## 2023-09-15 PROCEDURE — 85652 RBC SED RATE AUTOMATED: CPT | Performed by: INTERNAL MEDICINE

## 2023-09-15 PROCEDURE — 85025 COMPLETE CBC W/AUTO DIFF WBC: CPT | Mod: PN | Performed by: INTERNAL MEDICINE

## 2023-09-15 PROCEDURE — 80053 COMPREHEN METABOLIC PANEL: CPT | Mod: PN | Performed by: INTERNAL MEDICINE

## 2023-09-15 PROCEDURE — 86334 IMMUNOFIX E-PHORESIS SERUM: CPT | Mod: 26,,, | Performed by: PATHOLOGY

## 2023-09-15 PROCEDURE — 82784 ASSAY IGA/IGD/IGG/IGM EACH: CPT | Performed by: INTERNAL MEDICINE

## 2023-09-15 PROCEDURE — 86334 IMMUNOFIX E-PHORESIS SERUM: CPT | Performed by: INTERNAL MEDICINE

## 2023-09-15 PROCEDURE — 36415 COLL VENOUS BLD VENIPUNCTURE: CPT | Mod: PN | Performed by: INTERNAL MEDICINE

## 2023-09-15 PROCEDURE — 84165 PATHOLOGIST INTERPRETATION SPE: ICD-10-PCS | Mod: 26,,, | Performed by: PATHOLOGY

## 2023-09-15 PROCEDURE — 84165 PROTEIN E-PHORESIS SERUM: CPT | Mod: 26,,, | Performed by: PATHOLOGY

## 2023-09-15 PROCEDURE — 83615 LACTATE (LD) (LDH) ENZYME: CPT | Mod: PN | Performed by: INTERNAL MEDICINE

## 2023-09-15 PROCEDURE — 83521 IG LIGHT CHAINS FREE EACH: CPT | Mod: 59 | Performed by: INTERNAL MEDICINE

## 2023-09-18 LAB
ALBUMIN SERPL ELPH-MCNC: 3.66 G/DL (ref 3.35–5.55)
ALPHA1 GLOB SERPL ELPH-MCNC: 0.25 G/DL (ref 0.17–0.41)
ALPHA2 GLOB SERPL ELPH-MCNC: 0.56 G/DL (ref 0.43–0.99)
B-GLOBULIN SERPL ELPH-MCNC: 0.62 G/DL (ref 0.5–1.1)
GAMMA GLOB SERPL ELPH-MCNC: 0.91 G/DL (ref 0.67–1.58)
INTERPRETATION SERPL IFE-IMP: NORMAL
KAPPA LC SER QL IA: 3.94 MG/DL (ref 0.33–1.94)
KAPPA LC/LAMBDA SER IA: 1.63 (ref 0.26–1.65)
LAMBDA LC SER QL IA: 2.42 MG/DL (ref 0.57–2.63)
PROT SERPL-MCNC: 6 G/DL (ref 6–8.4)

## 2023-09-19 LAB
PATHOLOGIST INTERPRETATION IFE: NORMAL
PATHOLOGIST INTERPRETATION SPE: NORMAL

## 2023-10-10 NOTE — PROGRESS NOTES
Subjective:       Name: Samson Campos Jr.  : 1940  MRN: [id number]    Chief Complaint   Patient presents with    MGUS (monoclonal gammopathy of unknown significance)     6 mo follow up          HPI: Samson Campos Jr. is a 82 y.o. male presents to discuss the evaluation done to assess his MGUS (monoclonal gammopathy of unknown significance) (6 mo follow up)    He denies any fever, chills, night sweats, weight loss, recurrent infections or joint pain.    PREVIOUS WORK-UP:  He had blood workup drawn on 2023 that showed the presence of a free lambda light chain specific monoclonal band with an additional faint IgM lambda on his SPEP with immunofixation.  His M spike in the gamma area was quantified at 0.33 gram/deciliter.    His blood workup done on 2023 showed that his quantitative immunoglobulins came back normal.  Free light chain ratio came back normal.  The beta 2 microglobulin was elevated at 4.1.     Abdominal ultrasound done on 2023 showed  1. Renal cortical thinning bilaterally as can be seen with chronic medical renal disease.  2. Slightly coarse echotexture the liver, please correlate for possible liver disease.  3. Common bile duct of 8 mm likely relating to the patient's history of cholecystectomy.  4. Atherosclerotic aortic irregularity      The patient denies any fever chills night sweats weight loss nausea vomiting abdominal pain.      Oncology History    No history exists.        Past Medical History:   Diagnosis Date    Anticoagulant long-term use     Atrial fibrillation 2015    Cardiac pacemaker 2018    Cataract     os    Edema 2015    Fractures     Hypertension     Nuclear sclerosis 3/11/2015    Obstructive sleep apnea 2016    no cpap    Unspecified essential hypertension 2015    Venous insufficiency 2016       Past Surgical History:   Procedure Laterality Date    ABLATION OF ARRHYTHMOGENIC FOCUS FOR ATRIAL FIBRILLATION   2018    Procedure: Ablation atrial fibrillation;  Surgeon: Saud Julian III, MD;  Location: [facility] CATH;  Service: Radiology;;    ABLATION OF ARRHYTHMOGENIC FOCUS FOR ATRIAL FIBRILLATION N/A 2018    Procedure: Ablation atrial flutter;  Surgeon: Saud Julian III, MD;  [facility]ATH;  Service: Cardiology;  Laterality: N/A;    CARDIAC ELECTROPHYSIOLOGY STUDY Bilateral 2018    Procedure: EP STUDY;  Surgeon: Saud Julian III, MD;  Location: [facility] CATH;  Service: Radiology;  Laterality: Bilateral;    CARDIAC PACEMAKER PLACEMENT      CARDIOVERSION N/A 2018    Procedure: CARDIOVERSION;  Surgeon: Saud Julian III, MD;  Location: [facility];  Service: Cardiology;  Laterality: N/A;    CATARACT EXTRACTION W/  INTRAOCULAR LENS IMPLANT Right 3/11/15    By Tan    CHOLECYSTECTOMY      COLONOSCOPY      HERNIA REPAIR      umbilical    JOINT REPLACEMENT Right     Knee    knee replacment\      ROTATOR CUFF REPAIR      TREATMENT OF CARDIAC ARRHYTHMIA  2018    Procedure: Cardioversion;  Surgeon: Saud Julian III, MD;  Location: [facility] CATH;  Service: Radiology;;       Family History   Problem Relation Age of Onset    Cataracts Mother     Cancer Father     Amblyopia Neg Hx     Blindness Neg Hx     Diabetes Neg Hx     Glaucoma Neg Hx     Hypertension Neg Hx     Macular degeneration Neg Hx     Retinal detachment Neg Hx     Strabismus Neg Hx     Stroke Neg Hx     Thyroid disease Neg Hx        Social History     Socioeconomic History    Marital status:    Tobacco Use    Smoking status: Former     Current packs/day: 0.00     Types: Cigarettes     Quit date: 1987     Years since quittin.3    Smokeless tobacco: Never    Tobacco comments:     quit 30 years   Substance and Sexual Activity    Alcohol use: Yes     Alcohol/week: 14.0 standard drinks of alcohol     Types: 14 Cans of beer per week     Comment: Every other day    Drug use: No       Review of patient's allergies indicates:  No  "Known Allergies    Review of Systems   Constitutional:  Negative for appetite change, fatigue and fever.   HENT:  Negative for facial swelling, mouth sores, postnasal drip and sore throat.    Eyes:  Negative for photophobia and visual disturbance.   Respiratory:  Positive for cough. Negative for chest tightness.    Cardiovascular:  Negative for chest pain and leg swelling.   Gastrointestinal:  Negative for abdominal pain, blood in stool, nausea and vomiting.   Endocrine: Negative for cold intolerance and polyuria.   Genitourinary:  Negative for dysuria, flank pain and hematuria.   Musculoskeletal:  Positive for arthralgias. Negative for back pain and joint swelling.   Integumentary:  Negative for color change and mole/lesion.   Neurological:  Negative for dizziness, weakness, numbness, headaches and memory loss.   Hematological:  Negative for adenopathy. Does not bruise/bleed easily.   Psychiatric/Behavioral:  Negative for decreased concentration. The patient is not nervous/anxious.             Objective:     Vitals:    10/11/23 1431   BP: (!) 115/59   BP Location: Left arm   Patient Position: Sitting   BP Method: Medium (Automatic)   Pulse: 71   Resp: 16   Temp: 97.7 °F (36.5 °C)   TempSrc: Temporal   SpO2: 96%   Weight: 101.2 kg (223 lb 1.7 oz)   Height: 5' 10" (1.778 m)              Physical Exam  Vitals reviewed.   Constitutional:       Appearance: Normal appearance.   HENT:      Head: Normocephalic and atraumatic.      Mouth/Throat:      Mouth: Mucous membranes are moist.      Pharynx: No oropharyngeal exudate.   Eyes:      General: No scleral icterus.     Pupils: Pupils are equal, round, and reactive to light.   Cardiovascular:      Rate and Rhythm: Normal rate and regular rhythm.      Pulses: Normal pulses.      Heart sounds: Normal heart sounds.   Pulmonary:      Effort: Pulmonary effort is normal.      Breath sounds: Normal breath sounds. No wheezing.   Abdominal:      General: Bowel sounds are normal. " There is no distension.      Tenderness: There is no abdominal tenderness.   Musculoskeletal:         General: No swelling or tenderness.      Cervical back: Neck supple.   Lymphadenopathy:      Cervical: No cervical adenopathy.   Skin:     Coloration: Skin is not jaundiced.      Findings: No bruising or rash.   Neurological:      General: No focal deficit present.      Mental Status: He is alert and oriented to person, place, and time.      Cranial Nerves: No cranial nerve deficit.      Motor: No weakness.      Gait: Gait normal.   Psychiatric:         Mood and Affect: Mood normal.         Behavior: Behavior normal.                Current Outpatient Medications on File Prior to Visit   Medication Sig    aspirin (ECOTRIN) 81 MG EC tablet TAKE 1 TABLET ONE TIME DAILY    calcium carbonate/vitamin D3 (VITAMIN D-3 ORAL) Take by mouth.    cyanocobalamin 1,000 mcg/mL injection INJECT 1 ML AS DIRECTED EVERY 30 DAYS    diclofenac sodium (VOLTAREN) 1 % Gel     docosahexaenoic acid/epa (FISH OIL ORAL) Take by mouth.    furosemide (LASIX) 20 MG tablet Take 1 tablet (20 mg total) by mouth once daily.    metoprolol succinate (TOPROL-XL) 50 MG 24 hr tablet TAKE 1 TABLET TWICE DAILY    mupirocin (BACTROBAN) 2 % ointment APPLY TO AFFECTED AREA TWICE A DAY.    traMADoL (ULTRAM) 50 mg tablet      No current facility-administered medications on file prior to visit.       CBC:  Lab Results   Component Value Date    WBC 4.53 09/15/2023    HGB 12.4 (L) 09/15/2023    HCT 36.0 (L) 09/15/2023     (H) 09/15/2023     09/15/2023         CMP:  Sodium   Date Value Ref Range Status   09/15/2023 141 136 - 145 mmol/L Final   09/30/2015 138 137 - 145 MMOL/L Final     Potassium   Date Value Ref Range Status   09/15/2023 4.5 3.5 - 5.1 mmol/L Final   09/30/2015 4.3 3.5 - 5.1 MMOL/L Final     Chloride   Date Value Ref Range Status   09/15/2023 106 95 - 110 mmol/L Final   09/30/2015 105 98 - 107 MMOL/L Final     CO2   Date Value Ref Range  Status   09/15/2023 24 23 - 29 mmol/L Final     Glucose   Date Value Ref Range Status   09/15/2023 141 (H) 70 - 110 mg/dL Final     BUN   Date Value Ref Range Status   09/15/2023 17 8 - 23 mg/dL Final     Creatinine   Date Value Ref Range Status   09/15/2023 1.3 0.5 - 1.4 mg/dL Final   09/30/2015 1.46 (H) 0.66 - 1.25 MG/DL Final     Calcium   Date Value Ref Range Status   09/15/2023 8.9 8.7 - 10.5 mg/dL Final     Total Protein   Date Value Ref Range Status   09/15/2023 6.3 6.0 - 8.4 g/dL Final     Albumin   Date Value Ref Range Status   09/15/2023 3.7 3.5 - 5.2 g/dL Final   08/21/2015 3.9 3.5 - 5.0 G/DL Final     Total Bilirubin   Date Value Ref Range Status   09/15/2023 1.4 (H) 0.1 - 1.0 mg/dL Final     Comment:     For infants and newborns, interpretation of results should be based  on gestational age, weight and in agreement with clinical  observations.    Premature Infant recommended reference ranges:  Up to 24 hours.............<8.0 mg/dL  Up to 48 hours............<12.0 mg/dL  3-5 days..................<15.0 mg/dL  6-29 days.................<15.0 mg/dL       Alkaline Phosphatase   Date Value Ref Range Status   09/15/2023 75 55 - 135 U/L Final     AST ([place])   Date Value Ref Range Status   02/24/2016 17 17 - 59 U/L Final     AST   Date Value Ref Range Status   09/15/2023 12 10 - 40 U/L Final     ALT   Date Value Ref Range Status   09/15/2023 11 10 - 44 U/L Final     Anion Gap   Date Value Ref Range Status   09/15/2023 11 8 - 16 mmol/L Final     eGFR if    Date Value Ref Range Status   07/26/2022 >60 >60 mL/min/1.73 m^2 Final     eGFR if non    Date Value Ref Range Status   07/26/2022 58 (A) >60 mL/min/1.73 m^2 Final     Comment:     Calculation used to obtain the estimated glomerular filtration  rate (eGFR) is the CKD-EPI equation.          US Abdomen Complete  Narrative: EXAMINATION:  US ABDOMEN COMPLETE    CLINICAL HISTORY:  thrombocytopenia; Thrombocytopenia,  unspecified    TECHNIQUE:  Complete abdominal ultrasound.    COMPARISON:  None    FINDINGS:  The pancreas demonstrates no obvious abnormality in its visualized portions.  The aorta demonstrates no obvious evidence of aneurysm in its visualized portions, irregularity is noted to the distal aorta as can be seen with atherosclerotic calcifications.  The inferior vena cava appears patent at the level of the liver.  A common bile duct is enlarged at 8 mm with evidence of cholecystectomy.  No obvious intrahepatic biliary dilatation is noted.  The liver measures 15.0 cm and demonstrates a coarse echotexture as can be seen with liver disease.  Please clinically correlate.  The main portal vein appears patent.  The hepatic confluence is suboptimally displayed, no obvious abnormality is noted in its partially visualized portion.  The right kidney measures 11.5 cm and demonstrates cortical thinning as can be seen with chronic medical renal disease.  The spleen measures 11.8 cm and demonstrates no evidence of obstruction.  The left kidney measures 11.6 cm and demonstrates cortical thinning as can be seen with chronic medical renal disease.  Impression: 1. Renal cortical thinning bilaterally as can be seen with chronic medical renal disease.  2. Slightly coarse echotexture the liver, please correlate for possible liver disease.  3. Common bile duct of 8 mm likely relating to the patient's history of cholecystectomy.  4. Atherosclerotic aortic irregularity    Electronically signed by: Misha Johnston MD  Date:    02/16/2023  Time:    12:04       ECOG SCORE                  Assessment/Plan:         MGUS free lambda light lamda chain and IgM Lambda:  -I reviewed independently the patient laboratory findings.  He was made aware that his blood workup showed that he has an abnormal SPEP with immunofixation.  The blood workup that included  beta 2 microglobulin quantitative immunoglobulins and free light chain came back normal.  His  24  hour urine collection for UPEP with immunofixation was not done.    I reviewed with the patient the natural progression of MGUS. This is a premalignant condition that could either resolved or possibly progressing to a blood related disorder.  Signs of progression of this abnormality include prolonged fever chills night sweats weight loss unintentional recurrent infection worsening of his kidney function hypercalcemia and worsening of his anemia.  The patient will continue to be monitored clinically as well with repeat blood workup.    -blood workup done on September 15, 2023 showed an M spike of 0.38 gram/deciliter compared to 0.33 grams/deciliter previously.  The free light chain showed an elevation in the kappa free light chain at 3.94 with a normal ratio.  Beta 2 microglobulin was elevated at 3.5.  The quantitative immunoglobulins ESR LDH were normal.  The CMP showed an elevation in the total bilirubin at 1.4 stable from his previous blood workup.  His CBC showed a normal white blood cell count at 4.53.  His hemoglobin improved at 12.4 and his platelet count were normal. His MGUS is stable.  -He will be seen back again in 6 months for a follow-up visit with repeat CBC CMP ESR LDH beta 2 microglobulin  light chain quantitative immunoglobulins and SPEP with immunofixation.     Leukopenia:  Resolved with a white blood cell count of 4.53 on September 15, 2023.      Macrocytic anemia:  -hemoglobin of 12.4 with an MCV of 104 on September 15, 2023 improved from July 31, 2023.  -His abdominal ultrasound done to assess his liver and spleen size came back normal. He has a fatty liver.  -Will continue to monitor his CBC      Sick sinus syndrome status post pacemaker placement:  -patient currently is taking aspirin and beta blocker.  His condition seems controlled.    Obesity:  BMI 32.01  -he was advised to exercise maintain healthy lifestyle and to lose weight.           Med Onc Chart Routing      Follow up with physician 6  months. with repeat labs to be done a week before his apoitment   Follow up with [facility]    Infusion scheduling note    Injection scheduling note    Labs CBC, CMP, SPEP, free light chains, LDH, beta 2 microglobulin and vitamin B12   Scheduling:  Preferred lab:  Lab interval:  IF, quatitative Ig, ESR   Imaging    Pharmacy appointment    Other referrals                   Plan was discussed with the patient at length, and he verbalized understanding. Samson was given an opportunity to ask questions that were answered to his satisfaction, and he was advised to call in the interval if any problems or questions arise.      Signed:  Linda Cabello MD   Hematology and Oncology  [facility] - HEMATOLOGY ONCOLOGY  [facility], [place]

## 2023-10-11 ENCOUNTER — OFFICE VISIT (OUTPATIENT)
Dept: HEMATOLOGY/ONCOLOGY | Facility: CLINIC | Age: 83
End: 2023-10-11
Payer: MEDICARE

## 2023-10-11 VITALS
BODY MASS INDEX: 31.94 KG/M2 | RESPIRATION RATE: 16 BRPM | DIASTOLIC BLOOD PRESSURE: 59 MMHG | OXYGEN SATURATION: 96 % | SYSTOLIC BLOOD PRESSURE: 115 MMHG | TEMPERATURE: 98 F | WEIGHT: 223.13 LBS | HEART RATE: 71 BPM | HEIGHT: 70 IN

## 2023-10-11 DIAGNOSIS — D51.8 OTHER VITAMIN B12 DEFICIENCY ANEMIA: ICD-10-CM

## 2023-10-11 DIAGNOSIS — D75.89 MACROCYTOSIS: ICD-10-CM

## 2023-10-11 DIAGNOSIS — D72.819 LEUKOPENIA, UNSPECIFIED TYPE: ICD-10-CM

## 2023-10-11 DIAGNOSIS — I49.5 SSS (SICK SINUS SYNDROME): ICD-10-CM

## 2023-10-11 DIAGNOSIS — E66.9 OBESITY (BMI 30.0-34.9): ICD-10-CM

## 2023-10-11 DIAGNOSIS — D47.2 MGUS (MONOCLONAL GAMMOPATHY OF UNKNOWN SIGNIFICANCE): Primary | ICD-10-CM

## 2023-10-11 PROCEDURE — 1160F PR REVIEW ALL MEDS BY PRESCRIBER/CLIN PHARMACIST DOCUMENTED: ICD-10-PCS | Mod: CPTII,S$GLB,, | Performed by: INTERNAL MEDICINE

## 2023-10-11 PROCEDURE — 99214 PR OFFICE/OUTPT VISIT, EST, LEVL IV, 30-39 MIN: ICD-10-PCS | Mod: S$GLB,,, | Performed by: INTERNAL MEDICINE

## 2023-10-11 PROCEDURE — 3288F PR FALLS RISK ASSESSMENT DOCUMENTED: ICD-10-PCS | Mod: CPTII,S$GLB,, | Performed by: INTERNAL MEDICINE

## 2023-10-11 PROCEDURE — 1159F MED LIST DOCD IN RCRD: CPT | Mod: CPTII,S$GLB,, | Performed by: INTERNAL MEDICINE

## 2023-10-11 PROCEDURE — 99214 OFFICE O/P EST MOD 30 MIN: CPT | Mod: S$GLB,,, | Performed by: INTERNAL MEDICINE

## 2023-10-11 PROCEDURE — 3078F PR MOST RECENT DIASTOLIC BLOOD PRESSURE < 80 MM HG: ICD-10-PCS | Mod: CPTII,S$GLB,, | Performed by: INTERNAL MEDICINE

## 2023-10-11 PROCEDURE — 99999 PR PBB SHADOW E&M-EST. PATIENT-LVL III: CPT | Mod: PBBFAC,,, | Performed by: INTERNAL MEDICINE

## 2023-10-11 PROCEDURE — 3074F SYST BP LT 130 MM HG: CPT | Mod: CPTII,S$GLB,, | Performed by: INTERNAL MEDICINE

## 2023-10-11 PROCEDURE — 3078F DIAST BP <80 MM HG: CPT | Mod: CPTII,S$GLB,, | Performed by: INTERNAL MEDICINE

## 2023-10-11 PROCEDURE — 99999 PR PBB SHADOW E&M-EST. PATIENT-LVL III: ICD-10-PCS | Mod: PBBFAC,,, | Performed by: INTERNAL MEDICINE

## 2023-10-11 PROCEDURE — 1159F PR MEDICATION LIST DOCUMENTED IN MEDICAL RECORD: ICD-10-PCS | Mod: CPTII,S$GLB,, | Performed by: INTERNAL MEDICINE

## 2023-10-11 PROCEDURE — 3288F FALL RISK ASSESSMENT DOCD: CPT | Mod: CPTII,S$GLB,, | Performed by: INTERNAL MEDICINE

## 2023-10-11 PROCEDURE — 1101F PT FALLS ASSESS-DOCD LE1/YR: CPT | Mod: CPTII,S$GLB,, | Performed by: INTERNAL MEDICINE

## 2023-10-11 PROCEDURE — 1126F AMNT PAIN NOTED NONE PRSNT: CPT | Mod: CPTII,S$GLB,, | Performed by: INTERNAL MEDICINE

## 2023-10-11 PROCEDURE — 1126F PR PAIN SEVERITY QUANTIFIED, NO PAIN PRESENT: ICD-10-PCS | Mod: CPTII,S$GLB,, | Performed by: INTERNAL MEDICINE

## 2023-10-11 PROCEDURE — 1160F RVW MEDS BY RX/DR IN RCRD: CPT | Mod: CPTII,S$GLB,, | Performed by: INTERNAL MEDICINE

## 2023-10-11 PROCEDURE — 3074F PR MOST RECENT SYSTOLIC BLOOD PRESSURE < 130 MM HG: ICD-10-PCS | Mod: CPTII,S$GLB,, | Performed by: INTERNAL MEDICINE

## 2023-10-11 PROCEDURE — 1101F PR PT FALLS ASSESS DOC 0-1 FALLS W/OUT INJ PAST YR: ICD-10-PCS | Mod: CPTII,S$GLB,, | Performed by: INTERNAL MEDICINE

## 2024-01-30 PROBLEM — E26.1 SECONDARY HYPERALDOSTERONISM: Status: ACTIVE | Noted: 2024-01-30

## 2024-01-30 PROBLEM — I50.9 HEART FAILURE, UNSPECIFIED HF CHRONICITY, UNSPECIFIED HEART FAILURE TYPE: Status: ACTIVE | Noted: 2024-01-30

## 2024-01-30 PROBLEM — I77.89 OTHER SPECIFIED DISORDERS OF ARTERIES AND ARTERIOLES: Status: ACTIVE | Noted: 2024-01-30

## 2024-04-11 ENCOUNTER — OFFICE VISIT (OUTPATIENT)
Dept: OTOLARYNGOLOGY | Facility: CLINIC | Age: 84
End: 2024-04-11
Payer: MEDICARE

## 2024-04-11 ENCOUNTER — LAB VISIT (OUTPATIENT)
Dept: LAB | Facility: HOSPITAL | Age: 84
End: 2024-04-11
Attending: INTERNAL MEDICINE
Payer: MEDICARE

## 2024-04-11 VITALS — BODY MASS INDEX: 32.92 KG/M2 | WEIGHT: 229.94 LBS | HEIGHT: 70 IN

## 2024-04-11 DIAGNOSIS — H61.23 BILATERAL IMPACTED CERUMEN: Primary | ICD-10-CM

## 2024-04-11 DIAGNOSIS — D47.2 MGUS (MONOCLONAL GAMMOPATHY OF UNKNOWN SIGNIFICANCE): ICD-10-CM

## 2024-04-11 DIAGNOSIS — D51.8 OTHER VITAMIN B12 DEFICIENCY ANEMIA: ICD-10-CM

## 2024-04-11 LAB
ALBUMIN SERPL BCP-MCNC: 3.5 G/DL (ref 3.5–5.2)
ALP SERPL-CCNC: 75 U/L (ref 55–135)
ALT SERPL W/O P-5'-P-CCNC: 12 U/L (ref 10–44)
ANION GAP SERPL CALC-SCNC: 9 MMOL/L (ref 8–16)
AST SERPL-CCNC: 15 U/L (ref 10–40)
B2 MICROGLOB SERPL-MCNC: 3.4 UG/ML (ref 0–2.5)
BILIRUB SERPL-MCNC: 1.4 MG/DL (ref 0.1–1)
BUN SERPL-MCNC: 21 MG/DL (ref 8–23)
CALCIUM SERPL-MCNC: 9.3 MG/DL (ref 8.7–10.5)
CHLORIDE SERPL-SCNC: 106 MMOL/L (ref 95–110)
CO2 SERPL-SCNC: 25 MMOL/L (ref 23–29)
CREAT SERPL-MCNC: 1.4 MG/DL (ref 0.5–1.4)
ERYTHROCYTE [DISTWIDTH] IN BLOOD BY AUTOMATED COUNT: 12.6 % (ref 11.5–14.5)
ERYTHROCYTE [SEDIMENTATION RATE] IN BLOOD BY PHOTOMETRIC METHOD: 3 MM/HR (ref 0–23)
EST. GFR  (NO RACE VARIABLE): 49.6 ML/MIN/1.73 M^2
GLUCOSE SERPL-MCNC: 193 MG/DL (ref 70–110)
HCT VFR BLD AUTO: 35.1 % (ref 40–54)
HGB BLD-MCNC: 12.4 G/DL (ref 14–18)
IGA SERPL-MCNC: 229 MG/DL (ref 40–350)
IGG SERPL-MCNC: 985 MG/DL (ref 650–1600)
IGM SERPL-MCNC: 92 MG/DL (ref 50–300)
IMM GRANULOCYTES # BLD AUTO: 0.01 K/UL (ref 0–0.04)
LDH SERPL L TO P-CCNC: 177 U/L (ref 110–260)
MCH RBC QN AUTO: 34.8 PG (ref 27–31)
MCHC RBC AUTO-ENTMCNC: 35.3 G/DL (ref 32–36)
MCV RBC AUTO: 99 FL (ref 82–98)
NEUTROPHILS # BLD AUTO: 2.4 K/UL (ref 1.8–7.7)
PLATELET # BLD AUTO: 185 K/UL (ref 150–450)
PMV BLD AUTO: 10.1 FL (ref 9.2–12.9)
POTASSIUM SERPL-SCNC: 5 MMOL/L (ref 3.5–5.1)
PROT SERPL-MCNC: 6.4 G/DL (ref 6–8.4)
RBC # BLD AUTO: 3.56 M/UL (ref 4.6–6.2)
SODIUM SERPL-SCNC: 140 MMOL/L (ref 136–145)
VIT B12 SERPL-MCNC: 451 PG/ML (ref 210–950)
WBC # BLD AUTO: 4.09 K/UL (ref 3.9–12.7)

## 2024-04-11 PROCEDURE — 99999 PR PBB SHADOW E&M-EST. PATIENT-LVL III: CPT | Mod: PBBFAC,,, | Performed by: NURSE PRACTITIONER

## 2024-04-11 PROCEDURE — 84165 PROTEIN E-PHORESIS SERUM: CPT | Performed by: INTERNAL MEDICINE

## 2024-04-11 PROCEDURE — 82232 ASSAY OF BETA-2 PROTEIN: CPT | Performed by: INTERNAL MEDICINE

## 2024-04-11 PROCEDURE — 85652 RBC SED RATE AUTOMATED: CPT | Performed by: INTERNAL MEDICINE

## 2024-04-11 PROCEDURE — 83615 LACTATE (LD) (LDH) ENZYME: CPT | Mod: PN | Performed by: INTERNAL MEDICINE

## 2024-04-11 PROCEDURE — 36415 COLL VENOUS BLD VENIPUNCTURE: CPT | Mod: PN | Performed by: INTERNAL MEDICINE

## 2024-04-11 PROCEDURE — 86334 IMMUNOFIX E-PHORESIS SERUM: CPT | Mod: 26,,, | Performed by: PATHOLOGY

## 2024-04-11 PROCEDURE — 83521 IG LIGHT CHAINS FREE EACH: CPT | Performed by: INTERNAL MEDICINE

## 2024-04-11 PROCEDURE — 84165 PROTEIN E-PHORESIS SERUM: CPT | Mod: 26,,, | Performed by: PATHOLOGY

## 2024-04-11 PROCEDURE — 80053 COMPREHEN METABOLIC PANEL: CPT | Mod: PN | Performed by: INTERNAL MEDICINE

## 2024-04-11 PROCEDURE — 82607 VITAMIN B-12: CPT | Performed by: INTERNAL MEDICINE

## 2024-04-11 PROCEDURE — 99499 UNLISTED E&M SERVICE: CPT | Mod: S$GLB,,, | Performed by: NURSE PRACTITIONER

## 2024-04-11 PROCEDURE — 85027 COMPLETE CBC AUTOMATED: CPT | Mod: PN | Performed by: INTERNAL MEDICINE

## 2024-04-11 PROCEDURE — 69210 REMOVE IMPACTED EAR WAX UNI: CPT | Mod: S$GLB,,, | Performed by: NURSE PRACTITIONER

## 2024-04-11 PROCEDURE — 86334 IMMUNOFIX E-PHORESIS SERUM: CPT | Performed by: INTERNAL MEDICINE

## 2024-04-11 PROCEDURE — 82784 ASSAY IGA/IGD/IGG/IGM EACH: CPT | Mod: 59 | Performed by: INTERNAL MEDICINE

## 2024-04-11 NOTE — PROGRESS NOTES
Subjective:       Patient ID: Samson Campos Jr. is a 83 y.o. male.    Chief Complaint: No chief complaint on file.    HPI  Patient saw me 15 months ago for referred otalgia and cerumen impactions. Returns today for same.  Denies any other current ENT symptoms or concerns.    Review of Systems   Constitutional: Negative.    HENT:  Positive for ear pain. Negative for ear discharge.    Eyes: Negative.    Respiratory: Negative.     Cardiovascular: Negative.    Gastrointestinal: Negative.    Musculoskeletal: Negative.    Integumentary:  Negative.   Neurological: Negative.    Hematological: Negative.    Psychiatric/Behavioral: Negative.           Objective:      Physical Exam  Vitals and nursing note reviewed.   Constitutional:       General: He is not in acute distress.     Appearance: He is well-developed. He is not ill-appearing.   HENT:      Head: Normocephalic and atraumatic.      Right Ear: Hearing, tympanic membrane, ear canal and external ear normal. No middle ear effusion. Tympanic membrane is not erythematous.      Left Ear: Hearing, tympanic membrane, ear canal and external ear normal.  No middle ear effusion. Tympanic membrane is not erythematous.      Nose: Nose normal.   Eyes:      General: Lids are normal. No scleral icterus.        Right eye: No discharge.         Left eye: No discharge.   Neck:      Trachea: Trachea normal. No tracheal deviation.   Cardiovascular:      Rate and Rhythm: Normal rate.   Pulmonary:      Effort: Pulmonary effort is normal. No respiratory distress.      Breath sounds: No stridor. No wheezing.   Musculoskeletal:         General: Normal range of motion.      Cervical back: Normal range of motion.   Skin:     General: Skin is warm and dry.   Neurological:      Mental Status: He is alert and oriented to person, place, and time.      Coordination: Coordination is intact.      Gait: Gait normal.   Psychiatric:         Attention and Perception: Attention normal.         Mood and  Affect: Mood normal.         Speech: Speech normal.         Behavior: Behavior normal. Behavior is cooperative.      SEPARATE PROCEDURE IN OFFICE:   Procedure: Removal of impacted cerumen, bilateral   Pre Procedure Diagnosis: Cerumen Impaction   Post Procedure Diagnosis: Cerumen Impaction   Verbal informed consent in regards to risk of trauma to ear canal, ear drum or hearing, discomfort during procedure and/or inability to remove cerumen impaction in one session or unforeseen events or complications.   No anesthesia.     Procedure in detail:   Ear canal visualized bilateral with appropriate size ear speculum utilizing Operating Head Binocular Otomicroscope   Utilizing the following:  Ring curet, delicate alligator forceps, and/or suction cannula was used. The impacted cerumen of the ear canals was removed atraumatically. The TM and EAC were then inspected and found to be clear of wax. See description of TMs/EACs in PE above.   Complications: No   Condition: Improved/Good    Assessment:       Problem List Items Addressed This Visit    None  Visit Diagnoses       Bilateral impacted cerumen    -  Primary              Plan:         Reassured negative aural exam  Recommend semiannual ear cleanings  Patient encouraged to return to clinic if symptoms worsen/persist and as needed for further ENT symptoms or concerns.

## 2024-04-12 LAB
ALBUMIN SERPL ELPH-MCNC: 3.63 G/DL (ref 3.35–5.55)
ALPHA1 GLOB SERPL ELPH-MCNC: 0.23 G/DL (ref 0.17–0.41)
ALPHA2 GLOB SERPL ELPH-MCNC: 0.58 G/DL (ref 0.43–0.99)
B-GLOBULIN SERPL ELPH-MCNC: 0.66 G/DL (ref 0.5–1.1)
GAMMA GLOB SERPL ELPH-MCNC: 0.99 G/DL (ref 0.67–1.58)
INTERPRETATION SERPL IFE-IMP: NORMAL
KAPPA LC SER QL IA: 3.99 MG/DL (ref 0.33–1.94)
KAPPA LC/LAMBDA SER IA: 1.36 (ref 0.26–1.65)
LAMBDA LC SER QL IA: 2.93 MG/DL (ref 0.57–2.63)
PROT SERPL-MCNC: 6.1 G/DL (ref 6–8.4)

## 2024-04-15 LAB
PATHOLOGIST INTERPRETATION IFE: NORMAL
PATHOLOGIST INTERPRETATION SPE: NORMAL

## 2024-04-18 ENCOUNTER — OFFICE VISIT (OUTPATIENT)
Dept: HEMATOLOGY/ONCOLOGY | Facility: CLINIC | Age: 84
End: 2024-04-18
Payer: MEDICARE

## 2024-04-18 VITALS
DIASTOLIC BLOOD PRESSURE: 63 MMHG | WEIGHT: 227.5 LBS | TEMPERATURE: 97 F | BODY MASS INDEX: 32.65 KG/M2 | HEART RATE: 86 BPM | OXYGEN SATURATION: 95 % | SYSTOLIC BLOOD PRESSURE: 134 MMHG

## 2024-04-18 DIAGNOSIS — D51.8 VITAMIN B12 DEFICIENCY (DIETARY) ANEMIA: ICD-10-CM

## 2024-04-18 DIAGNOSIS — E66.9 OBESITY (BMI 30.0-34.9): ICD-10-CM

## 2024-04-18 DIAGNOSIS — D47.2 MGUS (MONOCLONAL GAMMOPATHY OF UNKNOWN SIGNIFICANCE): Primary | ICD-10-CM

## 2024-04-18 DIAGNOSIS — D75.89 MACROCYTOSIS: ICD-10-CM

## 2024-04-18 DIAGNOSIS — D63.1 ANEMIA DUE TO STAGE 3A CHRONIC KIDNEY DISEASE: ICD-10-CM

## 2024-04-18 DIAGNOSIS — Z95.0 CARDIAC PACEMAKER: ICD-10-CM

## 2024-04-18 DIAGNOSIS — D51.8 OTHER VITAMIN B12 DEFICIENCY ANEMIA: ICD-10-CM

## 2024-04-18 DIAGNOSIS — I49.5 SSS (SICK SINUS SYNDROME): ICD-10-CM

## 2024-04-18 DIAGNOSIS — N18.31 ANEMIA DUE TO STAGE 3A CHRONIC KIDNEY DISEASE: ICD-10-CM

## 2024-04-18 PROBLEM — D72.819 LEUKOPENIA: Status: RESOLVED | Noted: 2023-02-06 | Resolved: 2024-04-18

## 2024-04-18 PROCEDURE — 1101F PT FALLS ASSESS-DOCD LE1/YR: CPT | Mod: CPTII,S$GLB,, | Performed by: INTERNAL MEDICINE

## 2024-04-18 PROCEDURE — 99999 PR PBB SHADOW E&M-EST. PATIENT-LVL IV: CPT | Mod: PBBFAC,,, | Performed by: INTERNAL MEDICINE

## 2024-04-18 PROCEDURE — 99214 OFFICE O/P EST MOD 30 MIN: CPT | Mod: S$GLB,,, | Performed by: INTERNAL MEDICINE

## 2024-04-18 PROCEDURE — 3288F FALL RISK ASSESSMENT DOCD: CPT | Mod: CPTII,S$GLB,, | Performed by: INTERNAL MEDICINE

## 2024-04-18 PROCEDURE — 3075F SYST BP GE 130 - 139MM HG: CPT | Mod: CPTII,S$GLB,, | Performed by: INTERNAL MEDICINE

## 2024-04-18 PROCEDURE — 1125F AMNT PAIN NOTED PAIN PRSNT: CPT | Mod: CPTII,S$GLB,, | Performed by: INTERNAL MEDICINE

## 2024-04-18 PROCEDURE — 3078F DIAST BP <80 MM HG: CPT | Mod: CPTII,S$GLB,, | Performed by: INTERNAL MEDICINE

## 2024-04-18 PROCEDURE — 1159F MED LIST DOCD IN RCRD: CPT | Mod: CPTII,S$GLB,, | Performed by: INTERNAL MEDICINE

## 2024-04-18 PROCEDURE — 1160F RVW MEDS BY RX/DR IN RCRD: CPT | Mod: CPTII,S$GLB,, | Performed by: INTERNAL MEDICINE

## 2024-04-18 NOTE — PROGRESS NOTES
Subjective:       Name: Samson Campos Jr.  : 1940  MRN: [id number]    Chief Complaint   Patient presents with    Follow-up     MGUS (monoclonal gammopathy of unknown significance)          HPI: Samson Campos Jr. is a 83 y.o. male presents to discuss the evaluation done to assess his Follow-up (MGUS (monoclonal gammopathy of unknown significance))  Patient reports to the clinic with his nephew.  He denies any fever, chills, night sweats, weight loss, recurrent infections or worsening of his baseline arthritic pain.     PREVIOUS WORK-UP:  He had blood workup drawn on 2023 that showed the presence of a free lambda light chain specific monoclonal band with an additional faint IgM lambda on his SPEP with immunofixation.  His M spike in the gamma area was quantified at 0.33 gram/deciliter.    His blood workup done on 2023 showed that his quantitative immunoglobulins came back normal.  Free light chain ratio came back normal.  The beta 2 microglobulin was elevated at 4.1.     Abdominal ultrasound done on 2023 showed  1. Renal cortical thinning bilaterally as can be seen with chronic medical renal disease.  2. Slightly coarse echotexture the liver, please correlate for possible liver disease.  3. Common bile duct of 8 mm likely relating to the patient's history of cholecystectomy.  4. Atherosclerotic aortic irregularity      The patient denies any fever chills night sweats weight loss nausea vomiting abdominal pain.      Oncology History    No history exists.        Past Medical History:   Diagnosis Date    Anticoagulant long-term use     Atrial fibrillation 2015    Cardiac pacemaker 2018    Cataract     os    Edema 2015    Fractures     Hypertension     Nuclear sclerosis 3/11/2015    Obstructive sleep apnea 2016    no cpap    Unspecified essential hypertension 2015    Venous insufficiency 2016       Past Surgical History:   Procedure  Laterality Date    ABLATION OF ARRHYTHMOGENIC FOCUS FOR ATRIAL FIBRILLATION  2018    Procedure: Ablation atrial fibrillation;  Surgeon: Saud Julian III, MD;  Location: [facility] CATH;  Service: Radiology;;    ABLATION OF ARRHYTHMOGENIC FOCUS FOR ATRIAL FIBRILLATION N/A 2018    Procedure: Ablation atrial flutter;  Surgeon: Saud Julian III, MD;  Location: [facility] CATH;  Service: Cardiology;  Laterality: N/A;    CARDIAC ELECTROPHYSIOLOGY STUDY Bilateral 2018    Procedure: EP STUDY;  Surgeon: Saud Julian III, MD;  Location: [facility] CATH;  Service: Radiology;  Laterality: Bilateral;    CARDIAC PACEMAKER PLACEMENT      CARDIOVERSION N/A 2018    Procedure: CARDIOVERSION;  Surgeon: Saud Julian III, MD;  Location: [facility];  Service: Cardiology;  Laterality: N/A;    CATARACT EXTRACTION W/  INTRAOCULAR LENS IMPLANT Right 3/11/15    By Tan    CHOLECYSTECTOMY      COLONOSCOPY      HERNIA REPAIR      umbilical    JOINT REPLACEMENT Right     Knee    knee replacment\      ROTATOR CUFF REPAIR      TREATMENT OF CARDIAC ARRHYTHMIA  2018    Procedure: Cardioversion;  Surgeon: Saud Julian III, MD;  Location: [facility];  Service: Radiology;;       Family History   Problem Relation Name Age of Onset    Cataracts Mother      Cancer Father      Amblyopia Neg Hx      Blindness Neg Hx      Diabetes Neg Hx      Glaucoma Neg Hx      Hypertension Neg Hx      Macular degeneration Neg Hx      Retinal detachment Neg Hx      Strabismus Neg Hx      Stroke Neg Hx      Thyroid disease Neg Hx         Social History     Socioeconomic History    Marital status:    Tobacco Use    Smoking status: Former     Current packs/day: 0.00     Types: Cigarettes     Quit date: 1987     Years since quittin.8    Smokeless tobacco: Never    Tobacco comments:     quit 30 years   Substance and Sexual Activity    Alcohol use: Yes     Alcohol/week: 14.0 standard drinks of alcohol     Types: 14 Cans of beer per week      Comment: Every other day    Drug use: No       Review of patient's allergies indicates:  No Known Allergies    Review of Systems   Constitutional:  Negative for appetite change, fatigue and fever.   HENT:  Negative for facial swelling, mouth sores, postnasal drip and sore throat.    Eyes:  Negative for photophobia and visual disturbance.   Respiratory:  Negative for cough and chest tightness.    Cardiovascular:  Negative for chest pain and leg swelling.   Gastrointestinal:  Negative for abdominal pain, blood in stool, nausea and vomiting.   Endocrine: Negative for cold intolerance and polyuria.   Genitourinary:  Negative for dysuria, flank pain and hematuria.   Musculoskeletal:  Positive for arthralgias (right shoulder and knee). Negative for back pain and joint swelling.   Integumentary:  Negative for color change and mole/lesion.   Neurological:  Negative for dizziness, weakness, numbness, headaches and memory loss.   Hematological:  Negative for adenopathy. Does not bruise/bleed easily.   Psychiatric/Behavioral:  Negative for decreased concentration. The patient is not nervous/anxious.             Objective:     Vitals:    04/18/24 1100   BP: 134/63   BP Location: Right arm   Patient Position: Sitting   BP Method: Medium (Automatic)   Pulse: 86   Temp: 97.4 °F (36.3 °C)   TempSrc: Temporal   SpO2: 95%   Weight: 103.2 kg (227 lb 8.2 oz)              Physical Exam  Vitals reviewed.   Constitutional:       Appearance: Normal appearance.   HENT:      Head: Normocephalic and atraumatic.      Mouth/Throat:      Mouth: Mucous membranes are moist.      Pharynx: No oropharyngeal exudate.   Eyes:      General: No scleral icterus.     Pupils: Pupils are equal, round, and reactive to light.   Cardiovascular:      Rate and Rhythm: Normal rate and regular rhythm.      Pulses: Normal pulses.      Heart sounds: Normal heart sounds.   Pulmonary:      Effort: Pulmonary effort is normal.      Breath sounds: Normal breath sounds. No  wheezing.   Abdominal:      General: Bowel sounds are normal. There is no distension.      Tenderness: There is no abdominal tenderness.   Musculoskeletal:         General: No swelling or tenderness.      Cervical back: Neck supple.   Lymphadenopathy:      Cervical: No cervical adenopathy.   Skin:     Coloration: Skin is not jaundiced.      Findings: No bruising or rash.   Neurological:      General: No focal deficit present.      Mental Status: He is alert and oriented to person, place, and time.      Cranial Nerves: No cranial nerve deficit.      Motor: No weakness.      Gait: Gait normal.   Psychiatric:         Mood and Affect: Mood normal.         Behavior: Behavior normal.                Current Outpatient Medications   Medication Sig Dispense Refill    apixaban (ELIQUIS) 5 mg Tab       aspirin (ECOTRIN) 81 MG EC tablet TAKE 1 TABLET EVERY DAY 90 tablet 1    calcium carbonate/vitamin D3 (VITAMIN D-3 ORAL) Take by mouth.      diclofenac sodium (VOLTAREN) 1 % Gel       docosahexaenoic acid/epa (FISH OIL ORAL) Take by mouth.      DODEX 1,000 mcg/mL injection INJECT 1 ML AS DIRECTED EVERY 30 DAYS 3 mL 10    furosemide (LASIX) 20 MG tablet TAKE 1 TABLET ONE TIME DAILY 90 tablet 1    metoprolol succinate (TOPROL-XL) 50 MG 24 hr tablet TAKE 1 TABLET TWICE DAILY 180 tablet 1    mupirocin (BACTROBAN) 2 % ointment APPLY TO AFFECTED AREA TWICE A DAY. 22 g 3    traMADoL (ULTRAM) 50 mg tablet        No current facility-administered medications for this visit.       CBC:  Lab Results   Component Value Date    WBC 4.09 04/11/2024    HGB 12.4 (L) 04/11/2024    HCT 35.1 (L) 04/11/2024    MCV 99 (H) 04/11/2024     04/11/2024         CMP:  Sodium   Date Value Ref Range Status   04/11/2024 140 136 - 145 mmol/L Final   09/30/2015 138 137 - 145 MMOL/L Final     Potassium   Date Value Ref Range Status   04/11/2024 5.0 3.5 - 5.1 mmol/L Final   09/30/2015 4.3 3.5 - 5.1 MMOL/L Final     Chloride   Date Value Ref Range Status    04/11/2024 106 95 - 110 mmol/L Final   09/30/2015 105 98 - 107 MMOL/L Final     CO2   Date Value Ref Range Status   04/11/2024 25 23 - 29 mmol/L Final     Glucose   Date Value Ref Range Status   04/11/2024 193 (H) 70 - 110 mg/dL Final     BUN   Date Value Ref Range Status   04/11/2024 21 8 - 23 mg/dL Final     Creatinine   Date Value Ref Range Status   04/11/2024 1.4 0.5 - 1.4 mg/dL Final   09/30/2015 1.46 (H) 0.66 - 1.25 MG/DL Final     Calcium   Date Value Ref Range Status   04/11/2024 9.3 8.7 - 10.5 mg/dL Final     Total Protein   Date Value Ref Range Status   04/11/2024 6.4 6.0 - 8.4 g/dL Final     Albumin   Date Value Ref Range Status   04/11/2024 3.5 3.5 - 5.2 g/dL Final   08/21/2015 3.9 3.5 - 5.0 G/DL Final     Total Bilirubin   Date Value Ref Range Status   04/11/2024 1.4 (H) 0.1 - 1.0 mg/dL Final     Comment:     For infants and newborns, interpretation of results should be based  on gestational age, weight and in agreement with clinical  observations.    Premature Infant recommended reference ranges:  Up to 24 hours.............<8.0 mg/dL  Up to 48 hours............<12.0 mg/dL  3-5 days..................<15.0 mg/dL  6-29 days.................<15.0 mg/dL       Alkaline Phosphatase   Date Value Ref Range Status   04/11/2024 75 55 - 135 U/L Final     AST ([place])   Date Value Ref Range Status   02/24/2016 17 17 - 59 U/L Final     AST   Date Value Ref Range Status   04/11/2024 15 10 - 40 U/L Final     ALT   Date Value Ref Range Status   04/11/2024 12 10 - 44 U/L Final     Anion Gap   Date Value Ref Range Status   04/11/2024 9 8 - 16 mmol/L Final     eGFR if    Date Value Ref Range Status   07/26/2022 >60 >60 mL/min/1.73 m^2 Final     eGFR if non    Date Value Ref Range Status   07/26/2022 58 (A) >60 mL/min/1.73 m^2 Final     Comment:     Calculation used to obtain the estimated glomerular filtration  rate (eGFR) is the CKD-EPI equation.          US Abdomen  Complete  Narrative: EXAMINATION:  US ABDOMEN COMPLETE    CLINICAL HISTORY:  thrombocytopenia; Thrombocytopenia, unspecified    TECHNIQUE:  Complete abdominal ultrasound.    COMPARISON:  None    FINDINGS:  The pancreas demonstrates no obvious abnormality in its visualized portions.  The aorta demonstrates no obvious evidence of aneurysm in its visualized portions, irregularity is noted to the distal aorta as can be seen with atherosclerotic calcifications.  The inferior vena cava appears patent at the level of the liver.  A common bile duct is enlarged at 8 mm with evidence of cholecystectomy.  No obvious intrahepatic biliary dilatation is noted.  The liver measures 15.0 cm and demonstrates a coarse echotexture as can be seen with liver disease.  Please clinically correlate.  The main portal vein appears patent.  The hepatic confluence is suboptimally displayed, no obvious abnormality is noted in its partially visualized portion.  The right kidney measures 11.5 cm and demonstrates cortical thinning as can be seen with chronic medical renal disease.  The spleen measures 11.8 cm and demonstrates no evidence of obstruction.  The left kidney measures 11.6 cm and demonstrates cortical thinning as can be seen with chronic medical renal disease.  Impression: 1. Renal cortical thinning bilaterally as can be seen with chronic medical renal disease.  2. Slightly coarse echotexture the liver, please correlate for possible liver disease.  3. Common bile duct of 8 mm likely relating to the patient's history of cholecystectomy.  4. 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  He was made aware that  his blood workup showed that he has an abnormal SPEP with immunofixation.  The blood workup that included  beta 2 microglobulin quantitative immunoglobulins and free light chain came back normal.  His  24 hour urine collection for UPEP with immunofixation was not done.    I reviewed with the patient the natural progression of MGUS. This is a premalignant condition that could either resolved or possibly progressing to a blood related disorder.  Signs of progression of this abnormality include prolonged fever chills night sweats weight loss unintentional recurrent infection worsening of his kidney function hypercalcemia and worsening of his anemia.  The patient will continue to be monitored clinically as well with repeat blood workup.    -blood workup done on September 15, 2023 showed an M spike of 0.38 gram/deciliter compared to 0.33 grams/deciliter previously.  The free light chain showed an elevation in the kappa free light chain at 3.94 with a normal ratio.  Beta 2 microglobulin was elevated at 3.5.  The quantitative immunoglobulins ESR LDH were normal.  The CMP showed an elevation in the total bilirubin at 1.4 stable from his previous blood workup.  His CBC showed a normal white blood cell count at 4.53.  His hemoglobin improved at 12.4 and his platelet count were normal. His MGUS is stable.  -blood workup done on April 11, 2024 showing a hemoglobin of 12.4 grams/deciliter.  His CMP showed a creatinine of 1.4 stable.  Quantitative immunoglobulin ESR LDH came back normal.  His beta 2 microglobulin came back at 3.4 stable.  Serum protein electrophoresis showed an M spike of 0.31 g/d improved from his previous blood workup where it was 0.38 g/dl.  His kappa over lambda free light chain ratio was normal.  His MGUS is stable and we will continue to monitor him clinically as well with repeat blood workup.  -He will be seen back again in 6 months for a follow-up visit with repeat CBC CMP ferritin ESR LDH beta 2  microglobulin  light chain quantitative immunoglobulins and SPEP with immunofixation.     Chronic kidney disease stage 3a:  Creatinine 1.4 with a GFR of 49.6.    Avoid nephrotoxic medication.    We will continue to monitor with repeat BMP.        Macrocytic anemia:  -hemoglobin of 12.4 with an MCV of 104 on September 15, 2023 improved from July 31, 2023.  -hgb 12.4 g/dl MCV 99 on 4/11/2024 stable.  -his microcytic anemia also can not reflect anemia of chronic kidney disease.  -His abdominal ultrasound done to assess his liver and spleen size came back normal. He has a fatty liver.  -Will continue to monitor his CBC      Abnormal liver function tests.    Total bilirubin 1.4 on April 11, 2024-stable  Ultrasound done on February 16, 2023 showed a course echo structure of the liver compatible with the liver disease.    Vitamin B12 deficiency:  On Vitamin B12 injection 1000 mcg IM q week  Vitamin B12: 451 on 4/11/2024        Sick sinus syndrome status post pacemaker placement:  -patient currently is taking aspirin and beta blocker.  His condition seems controlled.    Obesity:  BMI 32.65  -he was advised to exercise maintain healthy lifestyle and to lose weight.           Med Onc Chart Routing      Follow up with physician 6 months. with repeat labs to be drawn a week before his appoitment   Follow up with [facility]    Infusion scheduling note    Injection scheduling note    Labs CBC, beta 2 microglobulin, LDH, SPEP, vitamin B12, free light chains and ferritin   Scheduling:  Preferred lab:  Lab interval:  IF, ESR, Quantitative Ig   Imaging    Pharmacy appointment    Other referrals                   Plan was discussed with the patient at length, and he verbalized understanding. Samson was given an opportunity to ask questions that were answered to his satisfaction, and he was advised to call in the interval if any problems or questions arise.      Signed:  Linda Cabello MD   Hematology and Oncology  [facility]  [facility] - HEMATOLOGY ONCOLOGY  [facility], [place]

## 2024-07-21 PROBLEM — L03.119 CELLULITIS OF LOWER EXTREMITY: Status: ACTIVE | Noted: 2024-07-21

## 2024-10-09 ENCOUNTER — LAB VISIT (OUTPATIENT)
Dept: LAB | Facility: HOSPITAL | Age: 84
End: 2024-10-09
Attending: INTERNAL MEDICINE
Payer: MEDICARE

## 2024-10-09 DIAGNOSIS — D63.1 ANEMIA DUE TO STAGE 3A CHRONIC KIDNEY DISEASE: ICD-10-CM

## 2024-10-09 DIAGNOSIS — Z95.0 CARDIAC PACEMAKER: ICD-10-CM

## 2024-10-09 DIAGNOSIS — D51.8 VITAMIN B12 DEFICIENCY (DIETARY) ANEMIA: ICD-10-CM

## 2024-10-09 DIAGNOSIS — D75.89 MACROCYTOSIS: ICD-10-CM

## 2024-10-09 DIAGNOSIS — D47.2 MGUS (MONOCLONAL GAMMOPATHY OF UNKNOWN SIGNIFICANCE): ICD-10-CM

## 2024-10-09 DIAGNOSIS — N18.31 ANEMIA DUE TO STAGE 3A CHRONIC KIDNEY DISEASE: ICD-10-CM

## 2024-10-09 LAB
ALBUMIN SERPL BCP-MCNC: 3.8 G/DL (ref 3.5–5.2)
ALP SERPL-CCNC: 76 U/L (ref 55–135)
ALT SERPL W/O P-5'-P-CCNC: 11 U/L (ref 10–44)
ANION GAP SERPL CALC-SCNC: 12 MMOL/L (ref 8–16)
AST SERPL-CCNC: 17 U/L (ref 10–40)
B2 MICROGLOB SERPL-MCNC: 3.5 UG/ML (ref 0–2.5)
BILIRUB SERPL-MCNC: 1.7 MG/DL (ref 0.1–1)
BUN SERPL-MCNC: 17 MG/DL (ref 8–23)
CALCIUM SERPL-MCNC: 9 MG/DL (ref 8.7–10.5)
CHLORIDE SERPL-SCNC: 105 MMOL/L (ref 95–110)
CO2 SERPL-SCNC: 24 MMOL/L (ref 23–29)
CREAT SERPL-MCNC: 1.3 MG/DL (ref 0.5–1.4)
ERYTHROCYTE [DISTWIDTH] IN BLOOD BY AUTOMATED COUNT: 12.9 % (ref 11.5–14.5)
ERYTHROCYTE [SEDIMENTATION RATE] IN BLOOD BY PHOTOMETRIC METHOD: 18 MM/HR (ref 0–23)
EST. GFR  (NO RACE VARIABLE): 54.2 ML/MIN/1.73 M^2
FERRITIN SERPL-MCNC: 528 NG/ML (ref 20–300)
GLUCOSE SERPL-MCNC: 127 MG/DL (ref 70–110)
HCT VFR BLD AUTO: 36.4 % (ref 40–54)
HGB BLD-MCNC: 12.5 G/DL (ref 14–18)
IGA SERPL-MCNC: 290 MG/DL (ref 40–350)
IGG SERPL-MCNC: 1224 MG/DL (ref 650–1600)
IGM SERPL-MCNC: 122 MG/DL (ref 50–300)
IMM GRANULOCYTES # BLD AUTO: 0.01 K/UL (ref 0–0.04)
LDH SERPL L TO P-CCNC: 180 U/L (ref 110–260)
MCH RBC QN AUTO: 33.8 PG (ref 27–31)
MCHC RBC AUTO-ENTMCNC: 34.3 G/DL (ref 32–36)
MCV RBC AUTO: 98 FL (ref 82–98)
NEUTROPHILS # BLD AUTO: 3 K/UL (ref 1.8–7.7)
PLATELET # BLD AUTO: 182 K/UL (ref 150–450)
PMV BLD AUTO: 10.2 FL (ref 9.2–12.9)
POTASSIUM SERPL-SCNC: 4.6 MMOL/L (ref 3.5–5.1)
PROT SERPL-MCNC: 7.3 G/DL (ref 6–8.4)
RBC # BLD AUTO: 3.7 M/UL (ref 4.6–6.2)
SODIUM SERPL-SCNC: 141 MMOL/L (ref 136–145)
WBC # BLD AUTO: 5.34 K/UL (ref 3.9–12.7)

## 2024-10-09 PROCEDURE — 84165 PROTEIN E-PHORESIS SERUM: CPT | Mod: 26,,, | Performed by: PATHOLOGY

## 2024-10-09 PROCEDURE — 80053 COMPREHEN METABOLIC PANEL: CPT | Mod: PN | Performed by: INTERNAL MEDICINE

## 2024-10-09 PROCEDURE — 85652 RBC SED RATE AUTOMATED: CPT | Performed by: INTERNAL MEDICINE

## 2024-10-09 PROCEDURE — 86334 IMMUNOFIX E-PHORESIS SERUM: CPT | Performed by: INTERNAL MEDICINE

## 2024-10-09 PROCEDURE — 82232 ASSAY OF BETA-2 PROTEIN: CPT | Performed by: INTERNAL MEDICINE

## 2024-10-09 PROCEDURE — 86334 IMMUNOFIX E-PHORESIS SERUM: CPT | Mod: 26,,, | Performed by: PATHOLOGY

## 2024-10-09 PROCEDURE — 85027 COMPLETE CBC AUTOMATED: CPT | Mod: PN | Performed by: INTERNAL MEDICINE

## 2024-10-09 PROCEDURE — 84165 PROTEIN E-PHORESIS SERUM: CPT | Performed by: INTERNAL MEDICINE

## 2024-10-09 PROCEDURE — 82728 ASSAY OF FERRITIN: CPT | Performed by: INTERNAL MEDICINE

## 2024-10-09 PROCEDURE — 82784 ASSAY IGA/IGD/IGG/IGM EACH: CPT | Mod: 59 | Performed by: INTERNAL MEDICINE

## 2024-10-09 PROCEDURE — 36415 COLL VENOUS BLD VENIPUNCTURE: CPT | Mod: PN | Performed by: INTERNAL MEDICINE

## 2024-10-09 PROCEDURE — 83615 LACTATE (LD) (LDH) ENZYME: CPT | Mod: PN | Performed by: INTERNAL MEDICINE

## 2024-10-09 PROCEDURE — 83521 IG LIGHT CHAINS FREE EACH: CPT | Mod: 59 | Performed by: INTERNAL MEDICINE

## 2024-10-10 LAB
ALBUMIN SERPL ELPH-MCNC: 3.81 G/DL (ref 3.35–5.55)
ALPHA1 GLOB SERPL ELPH-MCNC: 0.27 G/DL (ref 0.17–0.41)
ALPHA2 GLOB SERPL ELPH-MCNC: 0.68 G/DL (ref 0.43–0.99)
B-GLOBULIN SERPL ELPH-MCNC: 0.69 G/DL (ref 0.5–1.1)
GAMMA GLOB SERPL ELPH-MCNC: 1.15 G/DL (ref 0.67–1.58)
INTERPRETATION SERPL IFE-IMP: NORMAL
KAPPA LC SER QL IA: 4.72 MG/DL (ref 0.33–1.94)
KAPPA LC/LAMBDA SER IA: 1.32 (ref 0.26–1.65)
LAMBDA LC SER QL IA: 3.57 MG/DL (ref 0.57–2.63)
PROT SERPL-MCNC: 6.6 G/DL (ref 6–8.4)

## 2024-10-13 LAB
PATHOLOGIST INTERPRETATION IFE: NORMAL
PATHOLOGIST INTERPRETATION SPE: NORMAL

## 2024-10-18 ENCOUNTER — OFFICE VISIT (OUTPATIENT)
Dept: HEMATOLOGY/ONCOLOGY | Facility: CLINIC | Age: 84
End: 2024-10-18
Payer: MEDICARE

## 2024-10-18 VITALS
DIASTOLIC BLOOD PRESSURE: 62 MMHG | OXYGEN SATURATION: 97 % | TEMPERATURE: 98 F | HEART RATE: 76 BPM | RESPIRATION RATE: 16 BRPM | HEIGHT: 70 IN | BODY MASS INDEX: 31.59 KG/M2 | SYSTOLIC BLOOD PRESSURE: 117 MMHG | WEIGHT: 220.69 LBS

## 2024-10-18 DIAGNOSIS — D75.89 MACROCYTOSIS: ICD-10-CM

## 2024-10-18 DIAGNOSIS — N18.31 STAGE 3A CHRONIC KIDNEY DISEASE: ICD-10-CM

## 2024-10-18 DIAGNOSIS — E66.811 OBESITY (BMI 30.0-34.9): ICD-10-CM

## 2024-10-18 DIAGNOSIS — K76.0 FATTY LIVER: ICD-10-CM

## 2024-10-18 DIAGNOSIS — D51.8 VITAMIN B12 DEFICIENCY (DIETARY) ANEMIA: ICD-10-CM

## 2024-10-18 DIAGNOSIS — R79.89 ABNORMAL LFTS: ICD-10-CM

## 2024-10-18 DIAGNOSIS — D47.2 MGUS (MONOCLONAL GAMMOPATHY OF UNKNOWN SIGNIFICANCE): Primary | ICD-10-CM

## 2024-10-18 PROCEDURE — 99999 PR PBB SHADOW E&M-EST. PATIENT-LVL III: CPT | Mod: PBBFAC,,, | Performed by: INTERNAL MEDICINE

## 2024-10-18 NOTE — PROGRESS NOTES
Subjective:       Name: Samson Campos Jr.  : 1940  MRN: [id number]    Chief Complaint   Patient presents with    Follow-up     MGUS (monoclonal gammopathy of unknown significance)          HPI: Samson Campos Jr. is a 83 y.o. male presents to discuss the evaluation done to assess his Follow-up (MGUS (monoclonal gammopathy of unknown significance))  Patient reports to the clinic by himself.  He has been taking care of his sick wife.  He is not able to cook her for himself and he has been losing weight progressively.  He denies any fever, chills, night sweats, recurrent infections or worsening of his baseline arthritic pain.     PREVIOUS WORK-UP:  He had blood workup drawn on 2023 that showed the presence of a free lambda light chain specific monoclonal band with an additional faint IgM lambda on his SPEP with immunofixation.  His M spike in the gamma area was quantified at 0.33 gram/deciliter.    His blood workup done on 2023 showed that his quantitative immunoglobulins came back normal.  Free light chain ratio came back normal.  The beta 2 microglobulin was elevated at 4.1.          Oncology History    No history exists.        Past Medical History:   Diagnosis Date    Anticoagulant long-term use     Atrial fibrillation 2015    Cardiac pacemaker 2018    Cataract     os    Edema 2015    Fractures     Hypertension     Nuclear sclerosis 3/11/2015    Obstructive sleep apnea 2016    no cpap    Unspecified essential hypertension 2015    Venous insufficiency 2016       Past Surgical History:   Procedure Laterality Date    ABLATION OF ARRHYTHMOGENIC FOCUS FOR ATRIAL FIBRILLATION  2018    Procedure: Ablation atrial fibrillation;  Surgeon: Saud Julian III, MD;  Location: [facility];  Service: Radiology;;    ABLATION OF ARRHYTHMOGENIC FOCUS FOR ATRIAL FIBRILLATION N/A 2018    Procedure: Ablation atrial flutter;  Surgeon: Saud Julian III, MD;   Location: [facility];  Service: Cardiology;  Laterality: N/A;    CARDIAC ELECTROPHYSIOLOGY STUDY Bilateral 2018    Procedure: EP STUDY;  Surgeon: Saud Julian III, MD;  Location: [facility];  Service: Radiology;  Laterality: Bilateral;    CARDIAC PACEMAKER PLACEMENT      CARDIOVERSION N/A 2018    Procedure: CARDIOVERSION;  Surgeon: Saud Julian III, MD;  Location: [facility];  Service: Cardiology;  Laterality: N/A;    CATARACT EXTRACTION W/  INTRAOCULAR LENS IMPLANT Right 3/11/15    By Tan    CHOLECYSTECTOMY      COLONOSCOPY      HERNIA REPAIR      umbilical    JOINT REPLACEMENT Right     Knee    knee replacment\      ROTATOR CUFF REPAIR      TREATMENT OF CARDIAC ARRHYTHMIA  2018    Procedure: Cardioversion;  Surgeon: Saud Julian III, MD;  Location: [facility];  Service: Radiology;;       Family History   Problem Relation Name Age of Onset    Cataracts Mother      Cancer Father      Amblyopia Neg Hx      Blindness Neg Hx      Diabetes Neg Hx      Glaucoma Neg Hx      Hypertension Neg Hx      Macular degeneration Neg Hx      Retinal detachment Neg Hx      Strabismus Neg Hx      Stroke Neg Hx      Thyroid disease Neg Hx         Social History     Socioeconomic History    Marital status:    Tobacco Use    Smoking status: Former     Current packs/day: 0.00     Types: Cigarettes     Quit date: 1987     Years since quittin.3    Smokeless tobacco: Never    Tobacco comments:     quit 30 years   Substance and Sexual Activity    Alcohol use: Yes     Alcohol/week: 14.0 standard drinks of alcohol     Types: 14 Cans of beer per week     Comment: drinks 1-2 beers per day    Drug use: No     Social Drivers of Health     Transportation Needs: No Transportation Needs (2024)    OASIS : Transportation     Lack of Transportation (Medical): No     Lack of Transportation (Non-Medical): No     Patient Unable or Declines to Respond: No       Review of patient's allergies indicates:  No  "Known Allergies    Review of Systems   Constitutional:  Negative for appetite change, fatigue and fever.   HENT:  Negative for facial swelling, mouth sores, postnasal drip and sore throat.    Eyes:  Negative for photophobia and visual disturbance.   Respiratory:  Negative for cough and chest tightness.    Cardiovascular:  Negative for chest pain and leg swelling.   Gastrointestinal:  Negative for abdominal pain, blood in stool, nausea and vomiting.   Endocrine: Negative for cold intolerance and polyuria.   Genitourinary:  Negative for dysuria, flank pain and hematuria.   Musculoskeletal:  Positive for arthralgias (right shoulder and knee). Negative for back pain and joint swelling.   Integumentary:  Negative for color change and mole/lesion.   Neurological:  Negative for dizziness, weakness, numbness, headaches and memory loss.   Hematological:  Negative for adenopathy. Does not bruise/bleed easily.   Psychiatric/Behavioral:  Negative for decreased concentration. The patient is not nervous/anxious.             Objective:     Vitals:    10/18/24 1037   BP: 117/62   BP Location: Left arm   Patient Position: Sitting   Pulse: 76   Resp: 16   Temp: 97.7 °F (36.5 °C)   TempSrc: Temporal   SpO2: 97%   Weight: 100.1 kg (220 lb 10.9 oz)   Height: 5' 10" (1.778 m)              Physical Exam  Vitals reviewed.   Constitutional:       Appearance: Normal appearance.   HENT:      Head: Normocephalic and atraumatic.      Mouth/Throat:      Mouth: Mucous membranes are moist.      Pharynx: No oropharyngeal exudate.   Eyes:      General: No scleral icterus.     Pupils: Pupils are equal, round, and reactive to light.   Cardiovascular:      Rate and Rhythm: Normal rate and regular rhythm.      Pulses: Normal pulses.      Heart sounds: Normal heart sounds.   Pulmonary:      Effort: Pulmonary effort is normal.      Breath sounds: Normal breath sounds. No wheezing.   Abdominal:      General: Bowel sounds are normal. There is no distension. "      Tenderness: There is no abdominal tenderness.   Musculoskeletal:         General: No swelling or tenderness.      Cervical back: Neck supple.   Lymphadenopathy:      Cervical: No cervical adenopathy.   Skin:     Coloration: Skin is not jaundiced.      Findings: No bruising or rash.   Neurological:      General: No focal deficit present.      Mental Status: He is alert and oriented to person, place, and time.      Cranial Nerves: No cranial nerve deficit.      Motor: No weakness.      Gait: Gait normal.   Psychiatric:         Mood and Affect: Mood normal.         Behavior: Behavior normal.                Current Outpatient Medications on File Prior to Visit   Medication Sig    aspirin (ECOTRIN) 81 MG EC tablet TAKE 1 TABLET EVERY DAY    calcium carbonate/vitamin D3 (VITAMIN D-3 ORAL) Take by mouth.    diclofenac sodium (VOLTAREN) 1 % Gel     docosahexaenoic acid/epa (FISH OIL ORAL) Take by mouth. Takes every few days    DODEX 1,000 mcg/mL injection INJECT 1 ML AS DIRECTED EVERY 30 DAYS    furosemide (LASIX) 20 MG tablet TAKE 1 TABLET EVERY DAY    metoprolol succinate (TOPROL-XL) 50 MG 24 hr tablet TAKE 1 TABLET TWICE DAILY    traMADoL (ULTRAM) 50 mg tablet Take 50 mg by mouth every 6 (six) hours as needed.    triamcinolone acetonide 0.1% (KENALOG) 0.1 % ointment Apply topically As instructed (to rash with dressing changes).     Current Facility-Administered Medications on File Prior to Visit   Medication    [COMPLETED] influenza (adjuvanted) (Fluad) 45 mcg/0.5 mL IM vaccine (> or = 64 yo) 0.5 mL       CBC:  Lab Results   Component Value Date    WBC 5.34 10/09/2024    HGB 12.5 (L) 10/09/2024    HCT 36.4 (L) 10/09/2024    MCV 98 10/09/2024     10/09/2024         CMP:  Sodium   Date Value Ref Range Status   10/09/2024 141 136 - 145 mmol/L Final   09/30/2015 138 137 - 145 MMOL/L Final     Potassium   Date Value Ref Range Status   10/09/2024 4.6 3.5 - 5.1 mmol/L Final   09/30/2015 4.3 3.5 - 5.1 MMOL/L Final      Chloride   Date Value Ref Range Status   10/09/2024 105 95 - 110 mmol/L Final   09/30/2015 105 98 - 107 MMOL/L Final     CO2   Date Value Ref Range Status   10/09/2024 24 23 - 29 mmol/L Final     Glucose   Date Value Ref Range Status   10/09/2024 127 (H) 70 - 110 mg/dL Final     BUN   Date Value Ref Range Status   10/09/2024 17 8 - 23 mg/dL Final     Creatinine   Date Value Ref Range Status   10/09/2024 1.3 0.5 - 1.4 mg/dL Final   09/30/2015 1.46 (H) 0.66 - 1.25 MG/DL Final     Calcium   Date Value Ref Range Status   10/09/2024 9.0 8.7 - 10.5 mg/dL Final     Total Protein   Date Value Ref Range Status   10/09/2024 7.3 6.0 - 8.4 g/dL Final     Albumin   Date Value Ref Range Status   10/09/2024 3.8 3.5 - 5.2 g/dL Final   08/21/2015 3.9 3.5 - 5.0 G/DL Final     Total Bilirubin   Date Value Ref Range Status   10/09/2024 1.7 (H) 0.1 - 1.0 mg/dL Final     Comment:     For infants and newborns, interpretation of results should be based  on gestational age, weight and in agreement with clinical  observations.    Premature Infant recommended reference ranges:  Up to 24 hours.............<8.0 mg/dL  Up to 48 hours............<12.0 mg/dL  3-5 days..................<15.0 mg/dL  6-29 days.................<15.0 mg/dL       Alkaline Phosphatase   Date Value Ref Range Status   10/09/2024 76 55 - 135 U/L Final     AST ([place])   Date Value Ref Range Status   02/24/2016 17 17 - 59 U/L Final     AST   Date Value Ref Range Status   10/09/2024 17 10 - 40 U/L Final     ALT   Date Value Ref Range Status   10/09/2024 11 10 - 44 U/L Final     Anion Gap   Date Value Ref Range Status   10/09/2024 12 8 - 16 mmol/L Final     eGFR if    Date Value Ref Range Status   07/26/2022 >60 >60 mL/min/1.73 m^2 Final     eGFR if non    Date Value Ref Range Status   07/26/2022 58 (A) >60 mL/min/1.73 m^2 Final     Comment:     Calculation used to obtain the estimated glomerular filtration  rate (eGFR) is the  CKD-EPI equation.          US Lower Extremity Veins Right  Narrative: EXAMINATION:  US LOWER EXTREMITY VEINS RIGHT    CLINICAL HISTORY:  Right leg swelling.    TECHNIQUE:  Duplex and color flow Doppler evaluation and graded compression of the right lower extremity veins was performed.    COMPARISON:  04/12/2017    FINDINGS:  The right common femoral, femoral, greater saphenous, deep femoral, popliteal, peroneal, posterior tibial, and anterior tibial veins are patent with normal compressibility.  The veins demonstrate normal phasic flow and augmentation response.  Impression: 1. No evidence of deep venous thrombosis in the right lower extremity.    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  He was made aware that his blood workup showed that he has an abnormal SPEP with immunofixation.  The blood workup that included  beta 2 microglobulin quantitative immunoglobulins and free light chain came back normal.  His  24 hour urine collection for UPEP with immunofixation was not done.    I reviewed with the patient the natural progression of MGUS. This is a premalignant condition that could either resolved or possibly progressing to a blood related disorder.  Signs of progression of this abnormality include prolonged fever chills night sweats weight loss unintentional recurrent infection worsening of his kidney function hypercalcemia and worsening of his anemia.  The patient will continue to be monitored clinically as well with repeat blood workup.    -blood workup done on September 15, 2023 showed an M spike of 0.38 gram/deciliter compared to 0.33 grams/deciliter previously.  The free light chain showed an elevation in the kappa  free light chain at 3.94 with a normal ratio.  Beta 2 microglobulin was elevated at 3.5.  The quantitative immunoglobulins ESR LDH were normal.  The CMP showed an elevation in the total bilirubin at 1.4 stable from his previous blood workup.  His CBC showed a normal white blood cell count at 4.53.  His hemoglobin improved at 12.4 and his platelet count were normal. His MGUS is stable.  -blood workup done on April 11, 2024 showing a hemoglobin of 12.4 grams/deciliter.  His CMP showed a creatinine of 1.4 stable.  Quantitative immunoglobulin ESR LDH came back normal.  His beta 2 microglobulin came back at 3.4 stable.  Serum protein electrophoresis showed an M spike of 0.31 g/d improved from his previous blood workup where it was 0.38 g/dl.  His kappa over lambda free light chain ratio was normal.  -Labs on 10/9/2024 showed an M spike of 0.38 g/dl- beta 2 microglobulin is 3.5 . LDH ESR quantitative Ig and K/L were WNL.  His MGUS is stable and we will continue to monitor him clinically as well with repeat blood workup.  -He will be seen back again in 6 months for a follow-up visit with repeat CBC CMP ferritin ESR LDH beta 2 microglobulin  light chain quantitative immunoglobulins and SPEP with immunofixation.     Chronic kidney disease stage 3a:  Creatinine 1.3 with a GFR of 54.2  Avoid nephrotoxic medication.    We will continue to monitor with repeat BMP.        Macrocytic anemia:  -Hgb 12.5 g/dl MCV 98 on 10/9/2024- Ferritin: 528  -hemoglobin of 12.4 with an MCV of 104 on September 15, 2023 improved from July 31, 2023.  -hgb 12.4 g/dl MCV 99 on 4/11/2024 stable.  -his microcytic anemia also can not reflect anemia of chronic kidney disease.  -His abdominal ultrasound done to assess his liver and spleen size came back normal. He has a fatty liver.  -Will continue to monitor his CBC      Abnormal liver function tests.    Total bilirubin 1.7 on 10/9/2024    Ultrasound done on February 16, 2023 showed a course echo  structure of the liver compatible with the liver disease.    Vitamin B12 deficiency:  On Vitamin B12 injection 1000 mcg IM q week  Vitamin B12: 451 on 4/11/2024        Obesity:  BMI 32.65  -he was advised to exercise maintain healthy lifestyle and to lose weight.           Med Onc Chart Routing      Follow up with physician 6 months. with repeat labs a week before   Follow up with [facility]    Infusion scheduling note    Injection scheduling note    Labs CBC, CMP, beta 2 microglobulin, SPEP, LDH and free light chains   Scheduling:  Preferred lab:  Lab interval:  IF, ESR quatitative ig   Imaging    Pharmacy appointment    Other referrals                   Plan was discussed with the patient at length, and he verbalized understanding. Samson was given an opportunity to ask questions that were answered to his satisfaction, and he was advised to call in the interval if any problems or questions arise.      Signed:  Linda Cabello MD   Hematology and Oncology  [facility] - HEMATOLOGY ONCOLOGY  [facility], [place]

## 2025-03-04 PROBLEM — Z71.89 ACP (ADVANCE CARE PLANNING): Status: ACTIVE | Noted: 2025-03-04

## 2025-03-04 PROBLEM — L97.919 NONHEALING ULCER OF RIGHT LOWER EXTREMITY: Status: ACTIVE | Noted: 2025-03-04

## 2025-03-06 PROBLEM — R54 AGE-RELATED PHYSICAL DEBILITY: Status: ACTIVE | Noted: 2025-03-06

## 2025-03-07 PROBLEM — L98.499 NONHEALING SKIN ULCER: Status: ACTIVE | Noted: 2025-03-07

## 2025-04-10 PROBLEM — I50.32 CHRONIC DIASTOLIC HEART FAILURE: Status: ACTIVE | Noted: 2025-04-10

## 2025-04-10 PROBLEM — D64.9 NORMOCYTIC ANEMIA: Status: ACTIVE | Noted: 2025-04-10

## 2025-04-10 PROBLEM — K21.9 GERD (GASTROESOPHAGEAL REFLUX DISEASE): Status: ACTIVE | Noted: 2025-04-10

## 2025-04-10 PROBLEM — L03.115 CELLULITIS OF RIGHT LOWER EXTREMITY: Status: ACTIVE | Noted: 2025-04-10

## 2025-04-10 PROBLEM — E66.811 CLASS 1 OBESITY IN ADULT: Status: ACTIVE | Noted: 2025-04-10

## 2025-04-10 PROBLEM — I73.9 PAD (PERIPHERAL ARTERY DISEASE): Status: ACTIVE | Noted: 2025-04-10

## 2025-04-11 PROBLEM — Z71.89 ACP (ADVANCE CARE PLANNING): Status: RESOLVED | Noted: 2025-03-04 | Resolved: 2025-04-11

## 2025-04-11 PROBLEM — Z95.0 CARDIAC PACEMAKER: Status: RESOLVED | Noted: 2018-02-23 | Resolved: 2025-04-11

## 2025-04-11 PROBLEM — Z74.09 OTHER REDUCED MOBILITY: Status: ACTIVE | Noted: 2025-04-11

## 2025-04-11 PROBLEM — K21.9 GERD (GASTROESOPHAGEAL REFLUX DISEASE): Status: RESOLVED | Noted: 2025-04-10 | Resolved: 2025-04-11

## 2025-04-11 PROBLEM — L98.499 NONHEALING SKIN ULCER: Status: RESOLVED | Noted: 2025-03-07 | Resolved: 2025-04-11

## 2025-04-12 PROBLEM — Z74.09 OTHER REDUCED MOBILITY: Status: RESOLVED | Noted: 2025-04-11 | Resolved: 2025-04-12

## 2025-05-08 ENCOUNTER — TELEPHONE (OUTPATIENT)
Dept: HEMATOLOGY/ONCOLOGY | Facility: CLINIC | Age: 85
End: 2025-05-08
Payer: MEDICARE